# Patient Record
(demographics unavailable — no encounter records)

---

## 2024-10-17 NOTE — VITALS
[Maximal Pain Intensity: 4/10] : 4/10 [Least Pain Intensity: 0/10] : 0/10 [80: Normal activity with effort; some signs or symptoms of disease.] : 80: Normal activity with effort; some signs or symptoms of disease.  [ECOG Performance Status: 2 - Ambulatory and capable of all self care but unable to carry out any work activities] : Performance Status: 2 - Ambulatory and capable of all self care but unable to carry out any work activities. Up and about more than 50% of waking hours [Date: ____________] : Patient's last distress assessment performed on [unfilled]. [0 - No Distress] : Distress Level: 0

## 2024-10-17 NOTE — REVIEW OF SYSTEMS
[Fatigue] : fatigue [Recent Change In Weight] : ~T recent weight change [Confused] : confusion [Negative] : Allergic/Immunologic [Blurred Vision: Grade 0] : Blurred Vision: Grade 0 [Mucositis Oral: Grade 0] : Mucositis Oral: Grade 0  [Xerostomia: Grade 0] : Xerostomia: Grade 0 [Oral Pain: Grade 0] : Oral Pain: Grade 0 [Concentration Impairment: Grade 0] : Concentration Impairment: Grade 0 [Dizziness: Grade 0] : Dizziness: Grade 0  [Headache: Grade 0] : Headache: Grade 0 [Lethargy: Grade 0] : Lethargy: Grade 0 [Fever] : no fever [Chills] : no chills [Night Sweats] : no night sweats [Dizziness] : no dizziness [Fainting] : no fainting [Difficulty Walking] : no difficulty walking [FreeTextEntry2] : 10 to 15 pounds weight loss [de-identified] : slight confusion or memory

## 2024-10-17 NOTE — HISTORY OF PRESENT ILLNESS
[FreeTextEntry1] : This is a 61 y/o M with a h/o HTN, DM2 who Presents for consultation to discuss the possible role of radiation therapy in his care.   The course of illness began when he presented to [facility] with altered mental status in September 2024. Reports he was having difficulty participating in work related activities thus he stopped going, also with decreased appetite and was staying in bed most days. Family also noted he was not his usual self.  During workup in the ER [facility] on 9/28/2024 appreciated an INTRA-AXIAL: 6.1 x 5.0 x 3.8 cm mostly hypoattenuating mass lesion in the left anterior frontal lobe crossing the anterior falx into the medial right temporal lobe with surrounding vasogenic edema. There is severe mass effect on the surrounding parenchyma and on the ventricular system with marked left-to-right midline shift of 1.6 cm. Metastatic workup was completed to include CT chest/abdomen on said day IMPRESSION: Large left lung mass concerning for primary lung neoplasm.1.3 cm gastrohepatic lymph node, and 1.4 cm lytic lesion in the left iliac bone concerning for distal metastasis.  MRI brain w w/o contrast 9/30/2024IMPRESSION:6.5 x 5.1 x 4.8 cm (maximal AP x TV x CC dimensions) peripherally enhancing, centrally necrotic mass within the left anterior frontal lobe demonstrating regions of restricted diffusion. Findings are suspicious for the presence of glioblastoma multiform. Differential diagnosis includes metastatic disease. Rightward midline shift of 1 cm. Rightward subfalcine herniation of 1.6 cm.  Patient was admitted for treatment and ultimately underwent a Left frontotemporal craniotomy and excision of tumor with MR stereotactic and ultrasonic guidance with Dr. Ty Reyes on 10/1/2024 PATH: Left frontal tumor on dura, biopsy Metastatic non-small cell carcinoma, compatible with adenocarcinoma of lung origin   MRI brain w w/o contrast 10/2/2024 IMPRESSION: Postsurgical changes in the left frontal region status post left frontal craniotomy and mass resection. Left frontal vasogenic edema with mass effect on the left lateral ventricle and approximately 1.0 cm of rightward midline shift similar to presurgical exam. Focal plaque like region of enhancement in the posterior aspect of the resection cavity possibly representing residual enhancing tumor versus postoperative changes. Recommend continued follow-up.  VISIT DATED: 10/17/2024 Patient presents for consult with Dr. Robb. Pt reports that he has lost 10 to 15 pounds since diagnosis. He does not sleep at night and doesn't have much pain. Spouse reports that he has unstable gait but the pt denies it. Pt used to be a smoker before the surgery smoking cigarettes for a couple years and switching over to cigars. Pt stiches are intact no redness or swelling surrounding the area. Pt wants to remain positive during this of treatment. Reports  Patient scheduled with Dr. Josh Roman (heme/onco) 10/18/2024

## 2024-10-17 NOTE — REVIEW OF SYSTEMS
[Fatigue] : fatigue [Recent Change In Weight] : ~T recent weight change [Confused] : confusion [Negative] : Allergic/Immunologic [Blurred Vision: Grade 0] : Blurred Vision: Grade 0 [Mucositis Oral: Grade 0] : Mucositis Oral: Grade 0  [Xerostomia: Grade 0] : Xerostomia: Grade 0 [Oral Pain: Grade 0] : Oral Pain: Grade 0 [Concentration Impairment: Grade 0] : Concentration Impairment: Grade 0 [Dizziness: Grade 0] : Dizziness: Grade 0  [Headache: Grade 0] : Headache: Grade 0 [Lethargy: Grade 0] : Lethargy: Grade 0 [Fever] : no fever [Chills] : no chills [Night Sweats] : no night sweats [Dizziness] : no dizziness [Fainting] : no fainting [Difficulty Walking] : no difficulty walking [FreeTextEntry2] : 10 to 15 pounds weight loss [de-identified] : slight confusion or memory

## 2024-10-17 NOTE — HISTORY OF PRESENT ILLNESS
[FreeTextEntry1] : This is a 61 y/o M with a h/o HTN, DM2 who Presents for consultation to discuss the possible role of radiation therapy in his care.   The course of illness began when he presented to [facility] with altered mental status in September 2024. Reports he was having difficulty participating in work related activities thus he stopped going, also with decreased appetite and was staying in bed most days. Family also noted he was not his usual self.  During workup in the ER CTH on 9/28/2024 appreciated an INTRA-AXIAL: 6.1 x 5.0 x 3.8 cm mostly hypoattenuating mass lesion in the left anterior frontal lobe crossing the anterior falx into the medial right temporal lobe with surrounding vasogenic edema. There is severe mass effect on the surrounding parenchyma and on the ventricular system with marked left-to-right midline shift of 1.6 cm. Metastatic workup was completed to include CT chest/abdomen on said day IMPRESSION: Large left lung mass concerning for primary lung neoplasm.1.3 cm gastrohepatic lymph node, and 1.4 cm lytic lesion in the left iliac bone concerning for distal metastasis.  MRI brain w w/o contrast 9/30/2024IMPRESSION:6.5 x 5.1 x 4.8 cm (maximal AP x TV x CC dimensions) peripherally enhancing, centrally necrotic mass within the left anterior frontal lobe demonstrating regions of restricted diffusion. Findings are suspicious for the presence of glioblastoma multiform. Differential diagnosis includes metastatic disease. Rightward midline shift of 1 cm. Rightward subfalcine herniation of 1.6 cm.  Patient was admitted for treatment and ultimately underwent a Left frontotemporal craniotomy and excision of tumor with MR stereotactic and ultrasonic guidance with Dr. Ty Reyes on 10/1/2024 PATH: Left frontal tumor on dura, biopsy Metastatic non-small cell carcinoma, compatible with adenocarcinoma of lung origin   MRI brain w w/o contrast 10/2/2024 IMPRESSION: Postsurgical changes in the left frontal region status post left frontal craniotomy and mass resection. Left frontal vasogenic edema with mass effect on the left lateral ventricle and approximately 1.0 cm of rightward midline shift similar to presurgical exam. Focal plaque like region of enhancement in the posterior aspect of the resection cavity possibly representing residual enhancing tumor versus postoperative changes. Recommend continued follow-up.  VISIT DATED: 10/17/2024 Patient presents for consult with Dr. Robb. Pt reports that he has lost 10 to 15 pounds since diagnosis. He does not sleep at night and doesn't have much pain. Spouse reports that he has unstable gait but the pt denies it. Pt used to be a smoker before the surgery smoking cigarettes for a couple years and switching over to cigars. Pt stiches are intact no redness or swelling surrounding the area. Pt wants to remain positive during this of treatment. Reports  Patient scheduled with Dr. Josh Roman (heme/onco) 10/18/2024

## 2024-10-18 NOTE — PHYSICAL EXAM
[Restricted in physically strenuous activity but ambulatory and able to carry out work of a light or sedentary nature] : Status 1- Restricted in physically strenuous activity but ambulatory and able to carry out work of a light or sedentary nature, e.g., light house work, office work [Normal] : affect appropriate [de-identified] : No icterus  [de-identified] : MMM O/P Clear [de-identified] : Supple No LAD; No thyromegaly  [de-identified] : Clear [de-identified] : S1 S2 [de-identified] : No edema  [de-identified] : Soft NT/ND No masses [de-identified] : No spine/CVA tenderness

## 2024-10-18 NOTE — HISTORY OF PRESENT ILLNESS
[Disease: _____________________] : Disease: [unfilled] [AJCC Stage: ____] : AJCC Stage: [unfilled] [de-identified] : Mr. Tomlin is a 62-year-old man, non-smoker, with PMH of HTN and DM2 who presented to [facility] 9/26/24 with decreased intake and altered mental status x 1 week. CT head followed by brain MRI showed a 6.5 x 5.1 x 4.8 cm peripherally enhancing, centrally necrotic mass in the left anterior frontal lobe, with rightward midline shift. 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 On 10/1/24 he underwent left craniotomy for tumor resection. Pathology of the excised left frontal brain tumor was interpreted as showing non-small cell carcinoma compatible with adenocarcinoma of lung origin. PDL1 TPS >95%.   Patient presents today with his wife for initial outpatient visit. He reports that his breathing is satisfactory.  Denies cough.  His wife reports that he wheezes at night.  He lost several pounds over the past few months. He completed the dexamethasone taper. He met with radiation oncology with plans for postop RT. [de-identified] : - Left frontal brain tumor excision: 10/1/2024: Metastatic non-small cell carcinoma compatible with adenocarcinoma of lung origin. PD-L1 high-positive > 95%.  Foundation pending

## 2024-10-18 NOTE — HISTORY OF PRESENT ILLNESS
[Disease: _____________________] : Disease: [unfilled] [AJCC Stage: ____] : AJCC Stage: [unfilled] [de-identified] : Mr. Tomlin is a 62-year-old man, non-smoker, with PMH of HTN and DM2 who presented to [facility] 9/26/24 with decreased intake and altered mental status x 1 week. CT head followed by brain MRI showed a 6.5 x 5.1 x 4.8 cm peripherally enhancing, centrally necrotic mass in the left anterior frontal lobe, with rightward midline shift. 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 On 10/1/24 he underwent left craniotomy for tumor resection. Pathology of the excised left frontal brain tumor was interpreted as showing non-small cell carcinoma compatible with adenocarcinoma of lung origin. PDL1 TPS >95%.   Patient presents today with his wife for initial outpatient visit. He reports that his breathing is satisfactory.  Denies cough.  His wife reports that he wheezes at night.  He lost several pounds over the past few months. He completed the dexamethasone taper. He met with radiation oncology with plans for postop RT. [de-identified] : - Left frontal brain tumor excision: 10/1/2024: Metastatic non-small cell carcinoma compatible with adenocarcinoma of lung origin. PD-L1 high-positive > 95%.  Foundation pending

## 2024-10-18 NOTE — RESULTS/DATA
[FreeTextEntry1] : - CT C/A/P with contrast 9/26/24: Large left lung mass concerning for primary lung neoplasm. 1.3 cm gastrohepatic lymph node, and 1.4cm lytic lesion in the left iliac bone concerning for distal metastasis.   - CT Head without contrast: 9/26/24: 6.1 x 5 x 3.8 cm mostly hypoattenuating mass lesion in the left anterior frontal lobe, crossing the anterior falx into the medial right temporal lobe with surrounding vasogenic edema. There is severe mass effect on the surrounding parenchyma and on the ventricular system with marked left-to-right midline shift of 1.6cm. Recommend contrast-enhanced MRI brain for further characterization.   - MRI brain 9/30/24: 6.5 x 5.1 x 4.8 cm peripherally enhancing, centrally necrotic mass within the left anterior frontal lobe demonstrating regions of restricted diffusion. Findings are suspicious for the presence of glioblastoma multiform. Differential diagnosis includes metastatic disease.  Right midline shift of 1cm. Rightward subfalcine herniation of 1.6cm. Basal cisterns are visualized. Mild asymmetric dilation of the right lateral ventricle.   - Surgical Pathology 10/1/24: 1. Left frontal tumor on dura, biopsy: Metastatic non-small cell carcinoma, compatible with adenocarcinoma of lung origin. 2. Left frontal tumor on dura, biopsy: Metastatic non-small cell carcinoma, compatible with adenocarcinoma of lung origin. 3. Left frontal brain tumor on dura, excision: Metastatic non-small cell carcinoma, compatible with adenocarcinoma of lung origin.  Note: Sections show a high-grade malignant neoplasm with predominantly solid growth and scattered foci of necrosis.  IHC: neoplastic cells are diffusely positive for high-molecular weight (HMW), cytokeratin, CK7, TTF-1, and p63, and are negative for CK20, GATA3, p40, NKX3.1, and PAAX8. GFAP highlights background brain parenchyma.  PDL1 TPS >95%.   - Cytology 10/1/24: Brain, left, frontal, fluid: Positive for malignant cells. Carcinoma.   - CT head without contrast 10/2/24: Postoperative changes compatible with left frontal craniotomy. Some low attenuation identified in the postop region with some associated air as well as subtle high attenuation. this area of high changes compatible areas of hemorrhage on postop material. Surrounding edema is again seen. Decreased mass effect on the anteior aspect of both lateral ventricle is seen. Residual right left shift is seen (1.1cm).   -MR Brain 10/2/24: Postsurgical changes in the left frontal region status post left frontal craniotomy and mass resection.  Left frontal vasogenic edema with mass effect on the left lateral ventricle and approximately 1cm of rightward midline shift similar to presurgical exam.  Focal plaque like region of enhancement in the posterior aspect of the resection cavity possibly representing residual enhancing tumor versus postoperative changes. Recommend continued follow-up.   - PET/CT 10/16/24:  1. Large peripherally FDG-avid, centrally photopenic multiloculated mass in the left perihilar/paramediastinal left upper lobe corresponds to known malignancy. Separate FDG-avid left upper lobe nodule is compatible with metastasis.  2. FDG-avid left perihilar, bilateral mediastinal, bilateral cervical, and distal right external iliac lymph nodes. The left perihilar and AP window lymph nodes are compatible with metastatic disease. Small FDG-avid bilateral mediastinal/cervical and right external iliac lymph nodes are indeterminate. Differential diagnosis includes inflammatory and/or neoplastic etiologies. Cervical/supraclavicular lymph nodes are accessible to an ultrasound-guided percutaneous needle biopsy.  3. Enlarged prostate gland with right greater than left hypermetabolism. Differential diagnosis includes infectious/inflammatory etiologies and prostate carcinoma. Urology consultation is recommended.  4. Nonspecific FDG-avid thickening of upper esophagus and nonspecific hypermetabolism of fundus and body of stomach, most intense along the lesser curvature, are indeterminate. Endoscopy is recommended to exclude neoplasm.  5. Mild, diffuse hypermetabolism in nonenlarged thyroid gland suggests thyroiditis. Please correlate clinically.  - Inpatient labs October 2024: WBC 20.8 hemoglobin 10.7 MCV 87 platelets 397K.  Serum creatinine 0.6-0.88

## 2024-10-18 NOTE — HISTORY OF PRESENT ILLNESS
[Disease: _____________________] : Disease: [unfilled] [AJCC Stage: ____] : AJCC Stage: [unfilled] [de-identified] : Mr. Tomlin is a 62-year-old man, non-smoker, with PMH of HTN and DM2 who presented to [facility] 9/26/24 with decreased intake and altered mental status x 1 week. CT head followed by brain MRI showed a 6.5 x 5.1 x 4.8 cm peripherally enhancing, centrally necrotic mass in the left anterior frontal lobe, with rightward midline shift. 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 On 10/1/24 he underwent left craniotomy for tumor resection. Pathology of the excised left frontal brain tumor was interpreted as showing non-small cell carcinoma compatible with adenocarcinoma of lung origin. PDL1 TPS >95%.   Patient presents today with his wife for initial outpatient visit. He reports that his breathing is satisfactory.  Denies cough.  His wife reports that he wheezes at night.  He lost several pounds over the past few months. He completed the dexamethasone taper. He met with radiation oncology with plans for postop RT. [de-identified] : - Left frontal brain tumor excision: 10/1/2024: Metastatic non-small cell carcinoma compatible with adenocarcinoma of lung origin. PD-L1 high-positive > 95%.  Foundation pending

## 2024-10-18 NOTE — ASSESSMENT
[Palliative] : Goals of care discussed with patient: Palliative [FreeTextEntry1] : NSCLC with adenocarcinoma histology with a large ABAD primary tumor with evidence of metastatic disease involving the intrathoracic lymph nodes, cervical lymph nodes and brain.  Discussed that his disease represents stage IV disease or metastatic disease that is not curable however systemic therapy can be employed with the goal of prolonging survival.  Patient is status post craniotomy resection of a large left frontal brain tumor metastasis October 2024.  Tumor tested PD-L1 high-positive and molecular testing is pending.  Discussed that the mainstay of treatment would be systemic therapy. Recommend: - To undergo postop GK-SRS status post craniotomy resection of his brain metastasis - Follow-up molecular testing on his brain metastasis which is currently pending.  Discussed that it is unlikely an actionable mutation is present that is appropriate for targeting in the first-line setting given his extensive smoking history - Discussed and recommended first-line systemic therapy assuming his molecular testing is negative for any actionable mutation appropriate for treatment in the first-line setting.  Given the PD-L1 high-positive status of his tumor, and given that the patient has been titrated off steroids, could pursue treatment with single agent pembrolizumab 200 mg IV every 3 weeks.  Also discussed potentially the addition of chemotherapy with carboplatin AUC 5 and pemetrexed 500 mg/m2 administered IV every 3 weeks. - Risks, benefits and side effects of systemic therapy were discussed with the patient who agreed to proceed and signed the consent form; drug info sheets provided. -Obtain blood work in the office today in preparation for systemic therapy - Evaluate PSA given the PET CT scan findings involving the prostate - Evaluate TFTs given the PET CT scan findings involving the thyroid gland - Obtain initial anemia evaluation with labs today - There was an apparent lytic lesion of the left iliac bone noted on inpatient CT scan, however, this was not noted to be hypermetabolic on his PET CT scan.  Would avoid the addition of a bone modifying agent at this juncture - PET CT scan noted non-FDG avid thickening of the upper esophagus and nonspecific avidity of the stomach which were felt to be indeterminant for which EGD could be considered.  Consider nonurgent GI evaluation in the future. - In summary: Pursue postop GK-SRS treatment for his brain with radiation oncology.  Follow-up molecular testing results.  If negative for an actionable mutation, would plan to proceed with single agent pembrolizumab given the PD-L1 high-positive status of the tumor.  Could consider the role of the addition of chemotherapy with carboplatin/pemetrexed depending upon his clinical course.  Would plan to obtain a restaging scan following 3 cycles to assess response.  Discussed that treatment will be ongoing, for as long as the benefit the patient, or less he developed an intolerable side effect.  Can arrange for the start of systemic therapy likely for late October or early November depending upon completion of his RT treatment. All questions answered to their apparent satisfaction

## 2024-10-18 NOTE — PHYSICAL EXAM
[Restricted in physically strenuous activity but ambulatory and able to carry out work of a light or sedentary nature] : Status 1- Restricted in physically strenuous activity but ambulatory and able to carry out work of a light or sedentary nature, e.g., light house work, office work [Normal] : affect appropriate [de-identified] : No icterus  [de-identified] : MMM O/P Clear [de-identified] : Supple No LAD; No thyromegaly  [de-identified] : Clear [de-identified] : S1 S2 [de-identified] : No edema  [de-identified] : Soft NT/ND No masses [de-identified] : No spine/CVA tenderness

## 2024-10-18 NOTE — PHYSICAL EXAM
[Restricted in physically strenuous activity but ambulatory and able to carry out work of a light or sedentary nature] : Status 1- Restricted in physically strenuous activity but ambulatory and able to carry out work of a light or sedentary nature, e.g., light house work, office work [Normal] : affect appropriate [de-identified] : No icterus  [de-identified] : MMM O/P Clear [de-identified] : Supple No LAD; No thyromegaly  [de-identified] : Clear [de-identified] : S1 S2 [de-identified] : No edema  [de-identified] : Soft NT/ND No masses [de-identified] : No spine/CVA tenderness

## 2024-10-23 NOTE — REASON FOR VISIT
[Follow-Up: _____] : a [unfilled] follow-up visit [FreeTextEntry1] : Hospital Course:  Discharge Date 08-Oct-2024    Admission Date 26-Sep-2024 21:37  Reason for Admission 10/1 s/p left craniotomy for tumor resection    Hospital Course   62M w/ PMH of HTN, DM2 pw AMS. Reports of not going to work for the past week,  staying in bed w/ decreased PO intake. Family states he has also "not been  himself" In the ED VSS w/ labs notable for mild leukocytosis and imaging c/f  left lung mass and frontotemporal mass w/ midline shift and vasogenic edema.  Clinical diagnosis of lung cancer with a left lung mass and likely left frontal  brain metastasis. Path pending. Patient underwent on 10/1 Left crani for tumor  resection. Oncology following on 10/8 immunostains support non-small cell  carcinoma of lung origin (positive for CK7 and TTF-1). Given that this is not  small cell carcinoma, he does not need inpatient treatment. They will set up  outpatient follow up for him with a thoracic oncologist (per wife patient was  given an appointment with Dr. Rogers on 10/18 at 9AM. Pulmonology following  for CT chest with 7.4 x 4.8 cm L lung mass, lung, mediastinal adenopathy. L  lung mass noted to encompasses many of the ABAD pulmonary arterial branches, per  Pulmonology unlikely to require lung biopsy at this point. Hospital course  noted for moderate leukocytosis, currently on dexamethasone taper over 14 days.  ID following patient saw due to Enterococcus growing in broth only, the rest of  culture has been negative, patient non-toxic appearing and leukocytosis likely  to do steroids, monitor off abx. Radiation Oncology consulted and patient to  follow up with Dr. Robb outpatient. Physical therapy and occupational therapy  recommended outpatient PT/OT. On the day of discharge he is medically and  neurologically stable for discharge to home.    Med Reconciliation:  Override IMPROVE-DD recommendations due to: This is a surgical and/or  non-medical patient.  Recommended Post-Discharge VTE Prophylaxis This is a surgical and/or  non-medical patient.  Medication Reconciliation Status Admission Reconciliation is Completed  Discharge Reconciliation is Completed    Discharge Medications acetaminophen 325 mg oral tablet: 2 tab(s) orally every 6  hours As needed Mild Pain (1 - 3)  bisacodyl 5 mg oral delayed release tablet: 1 tab(s) orally once a day As  needed Constipation  dexAMETHasone 1 mg oral tablet: 3 tab(s) orally every 8 hours 3 tabs every 8  hours x 1 day, 2 tabs every 8 hours x 2 days, 2 tabs every 12 hours x 2 days, 2  tabs daily x 2 days, then 1 tab daily for 2 days then stop  levETIRAcetam 500 mg oral tablet: 1 tab(s) orally 2 times a day  pantoprazole 40 mg oral delayed release tablet: 1 tab(s) orally once a day  (before a meal)  polyethylene glycol 3350 oral powder for reconstitution: 17 gram(s) orally once  a day  senna leaf extract oral tablet: 2 tab(s) orally once a day (at bedtime)

## 2024-10-23 NOTE — ASSESSMENT
[FreeTextEntry1] : Pt is s/p resection of a large left frontal NSLC (pt has left lung mass) on Oct 1.  He had initial speech affectation and confusion.  Seen by Oncology who is starting Ketruda and possible chemo for NSLC.  Pt saw Dr. Robb last Thursday who will arrange SRS, hypo fractionated, for tumor region  Pt states he is not feeling too bad. Every once in a while he gets a twinge in the surgical area.  Pt speech is not to bad.  Pt feels a little slower than before.  No headaches per se except for the twinges.  Pt is off the steroid medication. Pt is still on Keppra 500 mg bid. Pt is going to get outpatient OT and PT. He is doing well right now with few symptoms.  Plan:  Pt for SRS with Dr. Robb Continue Keppra for now. Return in 1 month for follow up. Send report to PCP.

## 2024-10-23 NOTE — RESULTS/DATA
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 Evaluate extent of disease.  TREATMENT STRATEGY EVALUATION: Initial AREA IMAGED: Skull base-to-thigh FASTING BLOOD SUGAR: 83 mg/dl RADIOPHARMACEUTICAL: 10.50 mCi F-18 FDG, I.V. I.V. SITE: antecubital right UPTAKE PERIOD: 65 min SCANNER: [employer] Gen2 ORAL CONTRAST: Omnipaque 300 PHARMACOLOGIC INTERVENTION: None.  TECHNIQUE: Following intravenous injection of above radiopharmaceutical and uptake period, PET/CT was performed. CT protocol was optimized for PET attenuation correction and anatomic localization and was not designed to produce and cannot replace state-of-the-art diagnostic CT images with specific imaging protocols for different body parts and indications. Images were reconstructed and reviewed in axial, coronal and sagittal views and three-dimensional MIP.  The standardized uptake values (SUV) are normalized to patient body weight and indicate the highest activity concentration (SUVmax) in a given site. All image numbers refer to axial image number.  COMPARISON: None.  OTHER STUDIES USED FOR CORRELATION: None  FINDINGS:  HEAD/NECK: Incompletely imaged postoperative changes in left frontal calvarium and subadjacent frontal lobe, are better evaluated on recent head CT. Photopenic hypodensity in left frontal lobe incompletely imaged photopenic hypodense area in left frontal lobe, compatible with postsurgical changes.  Several small FDG-avid bilateral level 1, 2, and 4 cervical lymph nodes. For reference, a left level 1B node measures 0.9 x 0.6 cm, SUV 6.7 (image 48) a difficult to delineate right level IIA node measures 0.8 x 0.6 cm, SUV 9.4 (image 54).  Mild, diffuse FDG activity in nonenlarged thyroid gland suggests thyroiditis (SUV 3.5; image 80).  THORAX: Few small FDG-avid lymph nodes in bilateral supraclavicular, left paratracheal, AP window, right upper paraesophageal, and left perihilar region, similar in appearance as compared to recent CT. For reference, a subcentimeter, difficult to delineate right supraclavicular lymph node demonstrates SUV 7.3 (image 77). A left upper paratracheal node measures 0.8 x 0.7 cm, SUV 5.9 (image 94), AP window lymphadenopathy measures 1.7 x 1.5 cm, SUV 7.1 (image 107). Sub-5 mm right upper paraesophageal lymph nodes demonstrate SUV 5.4 (image 85). Difficult to delineate left hilar lymphadenopathy demonstrates SUV 17.3 (image 122).  LUNGS: Large peripherally FDG-avid, centrally photopenic multiloculated mass involving the left hilar, left upper lobe paramediastinal lung and left apex is similar in appearance as compared to CT dated 9/26/2024, measuring 6.5 x 3.6 cm, SUV 13.6 in left paramediastinal region (image 108). Separate FDG-avid left upper lobe pulmonary nodule, also unchanged on CT, measures 1.3 x 1.1 cm, SUV 7.2 (image 88). 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  PLEURA/PERICARDIUM: No abnormal FDG activity. No effusion.  HEPATOBILIARY/PANCREAS: Physiologic FDG activity. For reference, normal liver demonstrates SUV mean 2.0.  SPLEEN: Physiologic FDG activity. Normal in size.  ADRENAL GLANDS: No abnormal FDG activity. No nodule.  KIDNEYS/URINARY BLADDER: Physiologic excreted FDG activity.  REPRODUCTIVE ORGANS: Prostate gland is enlarged, measuring 5.8 cm in maximum transverse diameter. There is increased radiotracer activity in the bilateral prostate gland, more extensive on the right (SUV 14.2; image 283).  ABDOMINOPELVIC LYMPH NODES/RETROPERITONEUM: FDG-avid 5 mm right external iliac lymph node (SUV 5.8; image 260).  ESOPHAGUS/STOMACH/BOWEL/PERITONEUM/MESENTERY: Nonspecific FDG-avid thickening of upper esophagus (SUV 6.0; image 89)., Diffuse hypermetabolism in fundus and body of stomach, most intense along the lesser curvature (SUV 10.7; image 172).  VESSELS: Unremarkable.  BONES/SOFT TISSUES: No abnormal FDG activity.  IMPRESSION: Abnormal skull-to-thigh FDG-PET/CT scan.  1. Large peripherally FDG-avid, centrally photopenic multiloculated mass in the left perihilar/paramediastinal left upper lobe corresponds to known malignancy. Separate FDG-avid left upper lobe nodule is compatible with metastasis.  2. FDG-avid left perihilar, bilateral mediastinal, bilateral cervical, and distal right external iliac lymph nodes. The left perihilar and AP window lymph nodes are compatible with metastatic disease. Small FDG-avid bilateral mediastinal/cervical and right external iliac lymph nodes are indeterminate. Differential diagnosis includes inflammatory and/or neoplastic etiologies. Cervical/supraclavicular lymph nodes are accessible to an ultrasound-guided percutaneous needle biopsy.  3. Enlarged prostate gland with right greater than left hypermetabolism. Differential diagnosis includes infectious/inflammatory etiologies and prostate carcinoma. Urology consultation is recommended.  4. Nonspecific FDG-avid thickening of upper esophagus and nonspecific hypermetabolism of fundus and body of stomach, most intense along the lesser curvature, are indeterminate. Endoscopy is recommended to exclude neoplasm.  5. Mild, diffuse hypermetabolism in nonenlarged thyroid gland suggests thyroiditis. Please correlate clinically.  --- End of Report ---

## 2024-10-23 NOTE — PHYSICAL EXAM
[General Appearance - Alert] : alert [General Appearance - In No Acute Distress] : in no acute distress [Clean] : clean [Dry] : dry [Healing Well] : healing well [Intact] : intact [Sutures Intact] : closed with intact sutures [Oriented To Time, Place, And Person] : oriented to person, place, and time [Impaired Insight] : insight and judgment were intact [Motor Tone] : muscle tone was normal in all four extremities [FreeTextEntry1] : left frontotemporal incision with soft subcutaneous dependent temporal collections.  Monocryls intact [FreeTextEntry6] : MUNGUIA, no drift, good . [FreeTextEntry8] : gait is slow, but alternating and symmetrical.  Very mild imbalance at times.

## 2024-10-23 NOTE — RESULTS/DATA
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 Evaluate extent of disease.  TREATMENT STRATEGY EVALUATION: Initial AREA IMAGED: Skull base-to-thigh FASTING BLOOD SUGAR: 83 mg/dl RADIOPHARMACEUTICAL: 10.50 mCi F-18 FDG, I.V. I.V. SITE: antecubital right UPTAKE PERIOD: 65 min SCANNER: [employer] Gen2 ORAL CONTRAST: Omnipaque 300 PHARMACOLOGIC INTERVENTION: None.  TECHNIQUE: Following intravenous injection of above radiopharmaceutical and uptake period, PET/CT was performed. CT protocol was optimized for PET attenuation correction and anatomic localization and was not designed to produce and cannot replace state-of-the-art diagnostic CT images with specific imaging protocols for different body parts and indications. Images were reconstructed and reviewed in axial, coronal and sagittal views and three-dimensional MIP.  The standardized uptake values (SUV) are normalized to patient body weight and indicate the highest activity concentration (SUVmax) in a given site. All image numbers refer to axial image number.  COMPARISON: None.  OTHER STUDIES USED FOR CORRELATION: None  FINDINGS:  HEAD/NECK: Incompletely imaged postoperative changes in left frontal calvarium and subadjacent frontal lobe, are better evaluated on recent head CT. Photopenic hypodensity in left frontal lobe incompletely imaged photopenic hypodense area in left frontal lobe, compatible with postsurgical changes.  Several small FDG-avid bilateral level 1, 2, and 4 cervical lymph nodes. For reference, a left level 1B node measures 0.9 x 0.6 cm, SUV 6.7 (image 48) a difficult to delineate right level IIA node measures 0.8 x 0.6 cm, SUV 9.4 (image 54).  Mild, diffuse FDG activity in nonenlarged thyroid gland suggests thyroiditis (SUV 3.5; image 80).  THORAX: Few small FDG-avid lymph nodes in bilateral supraclavicular, left paratracheal, AP window, right upper paraesophageal, and left perihilar region, similar in appearance as compared to recent CT. For reference, a subcentimeter, difficult to delineate right supraclavicular lymph node demonstrates SUV 7.3 (image 77). A left upper paratracheal node measures 0.8 x 0.7 cm, SUV 5.9 (image 94), AP window lymphadenopathy measures 1.7 x 1.5 cm, SUV 7.1 (image 107). Sub-5 mm right upper paraesophageal lymph nodes demonstrate SUV 5.4 (image 85). Difficult to delineate left hilar lymphadenopathy demonstrates SUV 17.3 (image 122).  LUNGS: Large peripherally FDG-avid, centrally photopenic multiloculated mass involving the left hilar, left upper lobe paramediastinal lung and left apex is similar in appearance as compared to CT dated 9/26/2024, measuring 6.5 x 3.6 cm, SUV 13.6 in left paramediastinal region (image 108). Separate FDG-avid left upper lobe pulmonary nodule, also unchanged on CT, measures 1.3 x 1.1 cm, SUV 7.2 (image 88). 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  PLEURA/PERICARDIUM: No abnormal FDG activity. No effusion.  HEPATOBILIARY/PANCREAS: Physiologic FDG activity. For reference, normal liver demonstrates SUV mean 2.0.  SPLEEN: Physiologic FDG activity. Normal in size.  ADRENAL GLANDS: No abnormal FDG activity. No nodule.  KIDNEYS/URINARY BLADDER: Physiologic excreted FDG activity.  REPRODUCTIVE ORGANS: Prostate gland is enlarged, measuring 5.8 cm in maximum transverse diameter. There is increased radiotracer activity in the bilateral prostate gland, more extensive on the right (SUV 14.2; image 283).  ABDOMINOPELVIC LYMPH NODES/RETROPERITONEUM: FDG-avid 5 mm right external iliac lymph node (SUV 5.8; image 260).  ESOPHAGUS/STOMACH/BOWEL/PERITONEUM/MESENTERY: Nonspecific FDG-avid thickening of upper esophagus (SUV 6.0; image 89)., Diffuse hypermetabolism in fundus and body of stomach, most intense along the lesser curvature (SUV 10.7; image 172).  VESSELS: Unremarkable.  BONES/SOFT TISSUES: No abnormal FDG activity.  IMPRESSION: Abnormal skull-to-thigh FDG-PET/CT scan.  1. Large peripherally FDG-avid, centrally photopenic multiloculated mass in the left perihilar/paramediastinal left upper lobe corresponds to known malignancy. Separate FDG-avid left upper lobe nodule is compatible with metastasis.  2. FDG-avid left perihilar, bilateral mediastinal, bilateral cervical, and distal right external iliac lymph nodes. The left perihilar and AP window lymph nodes are compatible with metastatic disease. Small FDG-avid bilateral mediastinal/cervical and right external iliac lymph nodes are indeterminate. Differential diagnosis includes inflammatory and/or neoplastic etiologies. Cervical/supraclavicular lymph nodes are accessible to an ultrasound-guided percutaneous needle biopsy.  3. Enlarged prostate gland with right greater than left hypermetabolism. Differential diagnosis includes infectious/inflammatory etiologies and prostate carcinoma. Urology consultation is recommended.  4. Nonspecific FDG-avid thickening of upper esophagus and nonspecific hypermetabolism of fundus and body of stomach, most intense along the lesser curvature, are indeterminate. Endoscopy is recommended to exclude neoplasm.  5. Mild, diffuse hypermetabolism in nonenlarged thyroid gland suggests thyroiditis. Please correlate clinically.  --- End of Report ---

## 2024-10-23 NOTE — HISTORY OF PRESENT ILLNESS
[FreeTextEntry1] : Pt is s/p resection of a large left frontal NSLC (pt has left lung mass) on Oct 1.  He had initial speech affectation and confusion.  Seen by Oncology who is starting Ketruda and possible chemo for NSLC.  Pt saw Dr. Robb last Thursday who will arrange SRS, hypo fractionated, for tumor region  Pt states he is not feeling too bad. Every once in a while he gets a twinge in the surgical area.  Pt speech is not to bad.  Pt feels a little slower than before.  No headaches per se except for the twinges.  Pt is off the steroid medication. Pt is still on Keppra 500 mg bid. Pt is going to get outpatient OT and PT.  Sen information to PCP:  Wu Noble MD [address] (Corporate security) [phone] (Fax)

## 2024-10-23 NOTE — HISTORY OF PRESENT ILLNESS
[FreeTextEntry1] : Pt is s/p resection of a large left frontal NSLC (pt has left lung mass) on Oct 1.  He had initial speech affectation and confusion.  Seen by Oncology who is starting Ketruda and possible chemo for NSLC.  Pt saw Dr. Robb last Thursday who will arrange SRS, hypo fractionated, for tumor region  Pt states he is not feeling too bad. Every once in a while he gets a twinge in the surgical area.  Pt speech is not to bad.  Pt feels a little slower than before.  No headaches per se except for the twinges.  Pt is off the steroid medication. Pt is still on Keppra 500 mg bid. Pt is going to get outpatient OT and PT.  Sen information to PCP:  Wu Noble MD [address] (Corporate security) [phone] (Fax)

## 2024-11-20 NOTE — ASSESSMENT
[FreeTextEntry1] : Pt is s/p resection of a large left frontal NSLC (pt has left lung mass) on Oct 1. He had initial speech affectation and confusion. Seen by Oncology who is starting Ketruda and possible chemo for NSLC. Pt saw Dr. Robb last Thursday who will arrange SRS, hypo fractionated, for tumor region Pt states he is not feeling too bad. Every once in a while he gets a twinge in the surgical area. Pt speech is not to bad. Pt feels a little slower than before. No headaches per se except for the twinges. Pt is on Dex taper after SRS 2 weeks ago.  He is coming along, appears well.  Is thinking about long-term disability.  Has MRI on Jan21.  Plan:  Return in approx 2 months (Jan 21+) to review his MRI and assess. Send report to PCP.       CAD

## 2024-11-20 NOTE — BEGINNING OF VISIT
[0] : 2) Feeling down, depressed, or hopeless: Not at all (0) [PHQ-9 Deferred] : PHQ-9 Deferred [[id number]] : 0 [Pain Scale: ___] : On a scale of 1-10, today the patient's pain is a(n) [unfilled]. [Former] : Former [20 or more] : 20 or more [< 15 Years] : < 15 Years [Date Discussed (MM/DD/YY): ___] : Discussed: [unfilled] [With Patient/Caregiver] : with Patient/Caregiver

## 2024-11-20 NOTE — ASSESSMENT
[FreeTextEntry1] : Pt is s/p resection of a large left frontal NSLC (pt has left lung mass) on Oct 1. He had initial speech affectation and confusion. Seen by Oncology who is starting Ketruda and possible chemo for NSLC. Pt saw Dr. Robb last Thursday who will arrange SRS, hypo fractionated, for tumor region Pt states he is not feeling too bad. Every once in a while he gets a twinge in the surgical area. Pt speech is not to bad. Pt feels a little slower than before. No headaches per se except for the twinges. Pt is on Dex taper after SRS 2 weeks ago.  He is coming along, appears well.  Is thinking about long-term disability.  Has MRI on Jan21.  Plan:  Return in approx 2 months (Jan 21+) to review his MRI and assess. Send report to PCP.

## 2024-11-20 NOTE — PHYSICAL EXAM
[General Appearance - Alert] : alert [General Appearance - In No Acute Distress] : in no acute distress [Oriented To Time, Place, And Person] : oriented to person, place, and time [Impaired Insight] : insight and judgment were intact [Motor Tone] : muscle tone was normal in all four extremities [Motor Strength] : muscle strength was normal in all four extremities [FreeTextEntry1] : left frontotermporal incision healing well. [FreeTextEntry6] : MUNGUIA with no drift, good . [FreeTextEntry8] : walks with some antalgic properties at first, then able to walk with alternating steps, mosly symmetrical, but slowish.

## 2024-11-20 NOTE — HISTORY OF PRESENT ILLNESS
[FreeTextEntry1] : Pt is s/p resection of a large left frontal NSLC (pt has left lung mass) on Oct 1. He had initial speech affectation and confusion. Seen by Oncology who is starting Ketruda and possible chemo for NSLC. Pt saw Dr. Robb last Thursday who will arrange SRS, hypo fractionated, for tumor region Pt states he is not feeling too bad. Every once in a while he gets a twinge in the surgical area. Pt speech is not to bad. Pt feels a little slower than before. No headaches per se except for the twinges. Pt is off the steroid medication. Pt is still on Keppra 500 mg bid. Pt is going to get outpatient OT and PT. He is doing well right now with few symptoms.  Today, pt is feeling so-so.  Had SRS about 2 weeks ago x 3 fractions 10/29,30,31/2024.  Feels like he is not as agile as he was before. Sometimes he feels unbalanced while walking.  Walks a little slower now.  PT started one round of chemo on 11/8.  No headaches or dizziness. No N/V. Pt has put on some weight and has a good appetite.  Saw OT on Thursday last week  Dexamethasone taper, folic acid. metoclopramide prn nausea.  PT not on Keppra anymore.

## 2024-11-26 NOTE — HISTORY OF PRESENT ILLNESS
[Disease: _____________________] : Disease: [unfilled] [AJCC Stage: ____] : AJCC Stage: [unfilled] [de-identified] : Mr. Tomlin is a 62-year-old man, non-smoker, with PMH of HTN and DM2 who presented to [facility] 9/26/24 with decreased intake and altered mental status x 1 week. CT head followed by brain MRI showed a 6.5 x 5.1 x 4.8 cm peripherally enhancing, centrally necrotic mass in the left anterior frontal lobe, with rightward midline shift. 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 On 10/1/24 he underwent left craniotomy for tumor resection. Pathology of the excised left frontal brain tumor was interpreted as showing non-small cell carcinoma compatible with adenocarcinoma of lung origin. PDL1 TPS >95%.  Foundation negative for actionable mutations (+ BRAF G466A, NF1, TP53, among others) and TMB-High. Treated with GK-SRS to brain in late October 2024.  Required ongoing steroids.  Started first line combination chemotherapy and immunotherapy with Carboplatin/Pemetrexed and Pembrolizumab 11/8/24.    [de-identified] : - Left frontal brain tumor excision: 10/1/2024: Metastatic non-small cell carcinoma compatible with adenocarcinoma of lung origin. PD-L1 high-positive > 95%.  Foundation:  + BRAF G466A, NF1, TP53, among others.  TMB-High.  [de-identified] : Since the initial visit, Mr. Tomlin received GK-SRS from 10/29 - 10/31/24. He then started first line systemic therapy with combination chemotherapy and immunotherapy, with carboplatin, pemetrexed, and pembrolizumab, received first cycle on 11/8/24.  Foundation negative for actionable mutations (+ BRAF G466A, NF1, TP53, among others) and TMB-High.  He is seen now for mid-cycle evaluation.  Mr. Tomlin reports tolerating first cycle of chemoimmunotherapy well with mild fatigue He reports appetite and physical activity tolerance are improved. Weight is increased.  Reports breathing well.  He is completing steroid taper prescribed by Dr. Robb, last dose taken today.

## 2024-11-26 NOTE — PHYSICAL EXAM
[Restricted in physically strenuous activity but ambulatory and able to carry out work of a light or sedentary nature] : Status 1- Restricted in physically strenuous activity but ambulatory and able to carry out work of a light or sedentary nature, e.g., light house work, office work [Normal] : affect appropriate [de-identified] : No icterus  [de-identified] : MMM O/P Clear [de-identified] : Supple No LAD; No thyromegaly  [de-identified] : Clear [de-identified] : S1 S2 [de-identified] : No edema  [de-identified] : Soft NT/ND No masses [de-identified] : No spine/CVA tenderness

## 2024-11-26 NOTE — PHYSICAL EXAM
[Restricted in physically strenuous activity but ambulatory and able to carry out work of a light or sedentary nature] : Status 1- Restricted in physically strenuous activity but ambulatory and able to carry out work of a light or sedentary nature, e.g., light house work, office work [Normal] : affect appropriate [de-identified] : No icterus  [de-identified] : MMM O/P Clear [de-identified] : Supple No LAD; No thyromegaly  [de-identified] : Clear [de-identified] : S1 S2 [de-identified] : No edema  [de-identified] : Soft NT/ND No masses [de-identified] : No spine/CVA tenderness

## 2024-11-26 NOTE — PHYSICAL EXAM
[Restricted in physically strenuous activity but ambulatory and able to carry out work of a light or sedentary nature] : Status 1- Restricted in physically strenuous activity but ambulatory and able to carry out work of a light or sedentary nature, e.g., light house work, office work [Normal] : affect appropriate [de-identified] : No icterus  [de-identified] : MMM O/P Clear [de-identified] : Supple No LAD; No thyromegaly  [de-identified] : Clear [de-identified] : S1 S2 [de-identified] : No edema  [de-identified] : Soft NT/ND No masses [de-identified] : No spine/CVA tenderness

## 2024-11-26 NOTE — HISTORY OF PRESENT ILLNESS
[Disease: _____________________] : Disease: [unfilled] [AJCC Stage: ____] : AJCC Stage: [unfilled] [de-identified] : Mr. Tomlin is a 62-year-old man, non-smoker, with PMH of HTN and DM2 who presented to [facility] 9/26/24 with decreased intake and altered mental status x 1 week. CT head followed by brain MRI showed a 6.5 x 5.1 x 4.8 cm peripherally enhancing, centrally necrotic mass in the left anterior frontal lobe, with rightward midline shift. 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 On 10/1/24 he underwent left craniotomy for tumor resection. Pathology of the excised left frontal brain tumor was interpreted as showing non-small cell carcinoma compatible with adenocarcinoma of lung origin. PDL1 TPS >95%.  Foundation negative for actionable mutations (+ BRAF G466A, NF1, TP53, among others) and TMB-High. Treated with GK-SRS to brain in late October 2024.  Required ongoing steroids.  Started first line combination chemotherapy and immunotherapy with Carboplatin/Pemetrexed and Pembrolizumab 11/8/24.    [de-identified] : - Left frontal brain tumor excision: 10/1/2024: Metastatic non-small cell carcinoma compatible with adenocarcinoma of lung origin. PD-L1 high-positive > 95%.  Foundation:  + BRAF G466A, NF1, TP53, among others.  TMB-High.  [de-identified] : Since the initial visit, Mr. Tomlin received GK-SRS from 10/29 - 10/31/24. He then started first line systemic therapy with combination chemotherapy and immunotherapy, with carboplatin, pemetrexed, and pembrolizumab, received first cycle on 11/8/24.  Foundation negative for actionable mutations (+ BRAF G466A, NF1, TP53, among others) and TMB-High.  He is seen now for mid-cycle evaluation.  Mr. Tomlin reports tolerating first cycle of chemoimmunotherapy well with mild fatigue He reports appetite and physical activity tolerance are improved. Weight is increased.  Reports breathing well.  He is completing steroid taper prescribed by Dr. Robb, last dose taken today.

## 2024-11-26 NOTE — RESULTS/DATA
[FreeTextEntry1] : - CT C/A/P with contrast 9/26/24: Large left lung mass concerning for primary lung neoplasm. 1.3 cm gastrohepatic lymph node, and 1.4cm lytic lesion in the left iliac bone concerning for distal metastasis.   - CT Head without contrast: 9/26/24: 6.1 x 5 x 3.8 cm mostly hypoattenuating mass lesion in the left anterior frontal lobe, crossing the anterior falx into the medial right temporal lobe with surrounding vasogenic edema. There is severe mass effect on the surrounding parenchyma and on the ventricular system with marked left-to-right midline shift of 1.6cm. Recommend contrast-enhanced MRI brain for further characterization.   - MRI brain 9/30/24: 6.5 x 5.1 x 4.8 cm peripherally enhancing, centrally necrotic mass within the left anterior frontal lobe demonstrating regions of restricted diffusion. Findings are suspicious for the presence of glioblastoma multiform. Differential diagnosis includes metastatic disease.  Right midline shift of 1cm. Rightward subfalcine herniation of 1.6cm. Basal cisterns are visualized. Mild asymmetric dilation of the right lateral ventricle.   - Surgical Pathology 10/1/24: 1. Left frontal tumor on dura, biopsy: Metastatic non-small cell carcinoma, compatible with adenocarcinoma of lung origin. 2. Left frontal tumor on dura, biopsy: Metastatic non-small cell carcinoma, compatible with adenocarcinoma of lung origin. 3. Left frontal brain tumor on dura, excision: Metastatic non-small cell carcinoma, compatible with adenocarcinoma of lung origin.  Note: Sections show a high-grade malignant neoplasm with predominantly solid growth and scattered foci of necrosis.  IHC: neoplastic cells are diffusely positive for high-molecular weight (HMW), cytokeratin, CK7, TTF-1, and p63, and are negative for CK20, GATA3, p40, NKX3.1, and PAAX8. GFAP highlights background brain parenchyma.  PDL1 TPS >95%.   - Cytology 10/1/24: Brain, left, frontal, fluid: Positive for malignant cells. Carcinoma.   - CT head without contrast 10/2/24: Postoperative changes compatible with left frontal craniotomy. Some low attenuation identified in the postop region with some associated air as well as subtle high attenuation. this area of high changes compatible areas of hemorrhage on postop material. Surrounding edema is again seen. Decreased mass effect on the anteior aspect of both lateral ventricle is seen. Residual right left shift is seen (1.1cm).   -MR Brain 10/2/24: Postsurgical changes in the left frontal region status post left frontal craniotomy and mass resection.  Left frontal vasogenic edema with mass effect on the left lateral ventricle and approximately 1cm of rightward midline shift similar to presurgical exam.  Focal plaque like region of enhancement in the posterior aspect of the resection cavity possibly representing residual enhancing tumor versus postoperative changes. Recommend continued follow-up.   - PET/CT 10/16/24:  1. Large peripherally FDG-avid, centrally photopenic multiloculated mass in the left perihilar/paramediastinal left upper lobe corresponds to known malignancy. Separate FDG-avid left upper lobe nodule is compatible with metastasis.  2. FDG-avid left perihilar, bilateral mediastinal, bilateral cervical, and distal right external iliac lymph nodes. The left perihilar and AP window lymph nodes are compatible with metastatic disease. Small FDG-avid bilateral mediastinal/cervical and right external iliac lymph nodes are indeterminate. Differential diagnosis includes inflammatory and/or neoplastic etiologies. Cervical/supraclavicular lymph nodes are accessible to an ultrasound-guided percutaneous needle biopsy.  3. Enlarged prostate gland with right greater than left hypermetabolism. Differential diagnosis includes infectious/inflammatory etiologies and prostate carcinoma. Urology consultation is recommended.  4. Nonspecific FDG-avid thickening of upper esophagus and nonspecific hypermetabolism of fundus and body of stomach, most intense along the lesser curvature, are indeterminate. Endoscopy is recommended to exclude neoplasm.  5. Mild, diffuse hypermetabolism in nonenlarged thyroid gland suggests thyroiditis. Please correlate clinically.

## 2024-11-26 NOTE — HISTORY OF PRESENT ILLNESS
[Disease: _____________________] : Disease: [unfilled] [AJCC Stage: ____] : AJCC Stage: [unfilled] [de-identified] : Mr. Tomlin is a 62-year-old man, non-smoker, with PMH of HTN and DM2 who presented to [facility] 9/26/24 with decreased intake and altered mental status x 1 week. CT head followed by brain MRI showed a 6.5 x 5.1 x 4.8 cm peripherally enhancing, centrally necrotic mass in the left anterior frontal lobe, with rightward midline shift. 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 On 10/1/24 he underwent left craniotomy for tumor resection. Pathology of the excised left frontal brain tumor was interpreted as showing non-small cell carcinoma compatible with adenocarcinoma of lung origin. PDL1 TPS >95%.  Foundation negative for actionable mutations (+ BRAF G466A, NF1, TP53, among others) and TMB-High. Treated with GK-SRS to brain in late October 2024.  Required ongoing steroids.  Started first line combination chemotherapy and immunotherapy with Carboplatin/Pemetrexed and Pembrolizumab 11/8/24.    [de-identified] : - Left frontal brain tumor excision: 10/1/2024: Metastatic non-small cell carcinoma compatible with adenocarcinoma of lung origin. PD-L1 high-positive > 95%.  Foundation:  + BRAF G466A, NF1, TP53, among others.  TMB-High.  [de-identified] : Since the initial visit, Mr. Tomlin received GK-SRS from 10/29 - 10/31/24. He then started first line systemic therapy with combination chemotherapy and immunotherapy, with carboplatin, pemetrexed, and pembrolizumab, received first cycle on 11/8/24.  Foundation negative for actionable mutations (+ BRAF G466A, NF1, TP53, among others) and TMB-High.  He is seen now for mid-cycle evaluation.  Mr. Tomlin reports tolerating first cycle of chemoimmunotherapy well with mild fatigue He reports appetite and physical activity tolerance are improved. Weight is increased.  Reports breathing well.  He is completing steroid taper prescribed by Dr. Robb, last dose taken today.

## 2024-11-26 NOTE — REASON FOR VISIT
[Initial Consultation] : an initial consultation [Spouse] : spouse [Follow-Up Visit] : a follow-up [FreeTextEntry2] : Lung Cancer

## 2024-11-26 NOTE — ASSESSMENT
[Palliative] : Goals of care discussed with patient: Palliative [FreeTextEntry1] : 62M former smoker with Stage IV/metastatic NSCLC with adenocarcinoma histology with a large ABAD primary tumor with evidence of metastatic disease involving the intrathoracic lymph nodes, cervical lymph nodes and brain.    Patient is status post craniotomy resection of a large left frontal brain tumor metastasis October 2024.  Tumor tested PD-L1 high-positive and Foundation negative for actionable mutations (+ BRAF G466A, NF1, TP53, among others) and TMB-High. Received post-op GK-SRS in late-October 2024. He started first-line systemic therapy with combination of chemotherapy and immunotherapy with carboplatin, pemetrexed, and pembrolizumab on 11/8/24.  Recommend: - Continue first line systemic treatment with carboplatin AUC 5, pemetrexed 500mg/m2, and pembrolizumab 200mg IV every 3 weeks. Received C1 11/8. Due for cycle 2 on 11/29.  - Check mid-cycle bloodwork today.  - Plan for imaging after 3 cycles to assess response.  - Plan for 4 cycles total of triplet therapy, after which he will continue with maintenance pemetrexed and pembrolizumab every 3 weeks.  - Continue follow up with Dr. Reyes and Dr. Robb, s/p craniotomy resection of large frontal tumor 10/1/24 and s/p GK-SRS completed 10/31/24. Completed dexamethasone taper today and has next MRI brain Jan 21.  - Prescribe dexamethasone 4mg twice a day in the stacey-chemo setting, to take the day before and the day after chemo.  - Normal screening PSA which was checked given the PET CT scan findings involving the prostate. No immediate need for urology follow up.  - Normal TFTs checked given the PET CT scan findings involving the thyroid gland. Will check TFTs periodically while on pembrolizumab.  - Potentially start iron supplements given mild iron deficiency on previous bloodwork.  - There was an apparent lytic lesion of the left iliac bone noted on inpatient CT scan, however, this was not noted to be hypermetabolic on his PET CT scan.  Would avoid the addition of a bone modifying agent at this juncture - PET CT scan noted non-FDG avid thickening of the upper esophagus and nonspecific avidity of the stomach which were felt to be indeterminant for which EGD could be considered.  Consider nonurgent GI evaluation in the future. - Continue mid-cycle evaluation after each chemoimmunotherapy cycle.

## 2024-12-11 NOTE — ASSESSMENT
[FreeTextEntry1] : 62M former smoker with Stage IV/metastatic NSCLC with adenocarcinoma histology with a large ABAD primary tumor with evidence of metastatic disease involving the intrathoracic lymph nodes, cervical lymph nodes and brain.    Patient is status post craniotomy resection of a large left frontal brain tumor metastasis October 2024.  Tumor tested PD-L1 high-positive and Foundation negative for actionable mutations (+ BRAF G466A, NF1, TP53, among others) and TMB-High. Received post-op GK-SRS in late-October 2024. He started first-line systemic therapy with combination of chemotherapy and immunotherapy with carboplatin, pemetrexed, and pembrolizumab on 11/8/24.  Received cycle 2 on 11/29/24.  Recommend: - Continue first line systemic treatment with carboplatin AUC 5, pemetrexed 500mg/m2, and pembrolizumab 200mg IV every 3 weeks. Due for cycle 3 on 12/20.  - Continue dexamethasone in the stacey-chemo setting the day before and the day after chemo. Continue folic acid 1mg by mouth daily and vitamin B12 injection every 9 weeks.  - Mid-cycle bloodwork today: CBC, CMP, CEA.  - Plan for imaging after 3 cycles to assess response.  - Plan for 4 cycles total of triplet therapy, after which he will continue with maintenance pemetrexed and pembrolizumab every 3 weeks.  - Continue follow up with Dr. Reyes and Dr. Robb, s/p craniotomy resection of large frontal tumor 10/1/24 and s/p GK-SRS completed 10/31/24. Completed dexamethasone taper. Next MRI brain Jan 21 with Dr. Robb follow up on 1/23.  - Reached out to Dr. Reyes and Dr. Robb regarding whether patient should be continuing Keppra; he reports running out 2 weeks ago. Awaiting response.  - Normal screening PSA which was checked given the PET CT scan findings involving the prostate. No immediate need for urology follow up.  - Normal TFTs checked given the PET CT scan findings involving the thyroid gland. Will check TFTs periodically while on pembrolizumab. TSH 1.18 on 11/21.  - Potentially start iron supplements given mild iron deficiency on previous bloodwork.  - There was an apparent lytic lesion of the left iliac bone noted on inpatient CT scan, however, this was not noted to be hypermetabolic on his PET CT scan.  Would avoid the addition of a bone modifying agent at this juncture - PET CT scan noted non-FDG avid thickening of the upper esophagus and nonspecific avidity of the stomach which were felt to be indeterminant for which EGD could be considered.  Consider nonurgent GI evaluation in the future. - Reviewed safety precautions and potential use of a cane given recent mechanical fall.  - Requested PET/CT to be done after 3rd cycle of chemoimmunotherapy, to be reviewed at midcycle visit. Printed requisition provided to patient to make appt.  - Continue mid-cycle evaluation after each chemoimmunotherapy cycle.

## 2024-12-11 NOTE — HISTORY OF PRESENT ILLNESS
[Disease: _____________________] : Disease: [unfilled] [AJCC Stage: ____] : AJCC Stage: [unfilled] [de-identified] : Mr. Tomlin is a 62-year-old man, non-smoker, with PMH of HTN and DM2 who presented to [facility] 9/26/24 with decreased intake and altered mental status x 1 week. CT head followed by brain MRI showed a 6.5 x 5.1 x 4.8 cm peripherally enhancing, centrally necrotic mass in the left anterior frontal lobe, with rightward midline shift. 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 On 10/1/24 he underwent left craniotomy for tumor resection. Pathology of the excised left frontal brain tumor was interpreted as showing non-small cell carcinoma compatible with adenocarcinoma of lung origin. PDL1 TPS >95%.  Foundation negative for actionable mutations (+ BRAF G466A, NF1, TP53, among others) and TMB-High. Treated with GK-SRS to brain in late October 2024.  Required ongoing steroids.  Started first line combination chemotherapy and immunotherapy with Carboplatin/Pemetrexed and Pembrolizumab 11/8/24.    [de-identified] : - Left frontal brain tumor excision: 10/1/2024: Metastatic non-small cell carcinoma compatible with adenocarcinoma of lung origin. PD-L1 high-positive > 95%.  Foundation:  + BRAF G466A, NF1, TP53, among others.  TMB-High.  [de-identified] : Mr. Tomlin had left craniotomy and tumor resection 10/1/24 and received GK-SRS from 10/29 - 10/31/24.  He started first line systemic therapy with combination chemotherapy and immunotherapy, with carboplatin, pemetrexed, and pembrolizumab on 11/8/24.  Foundation negative for actionable mutations (+ BRAF G466A, NF1, TP53, among others) and TMB-High.   He is seen now for mid-cycle evaluation after receiving cycle 2 carboplatin, pemetrexed, and pembrolizumab on 11/29/24.  He reports fatigue. Appetite and taste reported ok. Denies F/C, N/V, C/D. Tinnitus bilaterally reported since his surgery.  States he slipped and fell getting off access-a-ride yesterday, denies any injury or hitting his head. Pt states he was not holding handrail and it was rainy. Denies using any assistive device for walking.  Upon reviewing medications, pt states he stopped taking keppra because he ran out two weeks ago.

## 2024-12-11 NOTE — PHYSICAL EXAM
[Restricted in physically strenuous activity but ambulatory and able to carry out work of a light or sedentary nature] : Status 1- Restricted in physically strenuous activity but ambulatory and able to carry out work of a light or sedentary nature, e.g., light house work, office work [Normal] : affect appropriate [de-identified] : No icterus  [de-identified] : MMM O/P Clear [de-identified] : Supple No LAD; No thyromegaly  [de-identified] : Clear [de-identified] : S1 S2 [de-identified] : No edema  [de-identified] : Soft NT/ND No masses [de-identified] : No spine/CVA tenderness  [de-identified] : Ambulatory, steady gait.

## 2024-12-31 NOTE — PHYSICAL EXAM
[Restricted in physically strenuous activity but ambulatory and able to carry out work of a light or sedentary nature] : Status 1- Restricted in physically strenuous activity but ambulatory and able to carry out work of a light or sedentary nature, e.g., light house work, office work [Normal] : affect appropriate [de-identified] : No icterus  [de-identified] : MMM O/P Clear [de-identified] : Supple No LAD; No thyromegaly  [de-identified] : Clear [de-identified] : S1 S2 [de-identified] : No edema  [de-identified] : Soft NT/ND No masses [de-identified] : No spine/CVA tenderness  [de-identified] : Ambulatory, steady gait.

## 2024-12-31 NOTE — HISTORY OF PRESENT ILLNESS
[Disease: _____________________] : Disease: [unfilled] [AJCC Stage: ____] : AJCC Stage: [unfilled] [de-identified] : Mr. Tomlin is a 62-year-old man, non-smoker, with PMH of HTN and DM2 who presented to [facility] 9/26/24 with decreased intake and altered mental status x 1 week. CT head followed by brain MRI showed a 6.5 x 5.1 x 4.8 cm peripherally enhancing, centrally necrotic mass in the left anterior frontal lobe, with rightward midline shift. 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 On 10/1/24 he underwent left craniotomy for tumor resection. Pathology of the excised left frontal brain tumor was interpreted as showing non-small cell carcinoma compatible with adenocarcinoma of lung origin. PDL1 TPS >95%.  Foundation negative for actionable mutations (+ BRAF G466A, NF1, TP53, among others) and TMB-High. Treated with GK-SRS to brain in late October 2024.  Required ongoing steroids.  Started first line combination chemotherapy and immunotherapy with Carboplatin/Pemetrexed and Pembrolizumab 11/8/24.    [de-identified] : - Left frontal brain tumor excision: 10/1/2024: Metastatic non-small cell carcinoma compatible with adenocarcinoma of lung origin. PD-L1 high-positive > 95%.  Foundation:  + BRAF G466A, NF1, TP53, among others.  TMB-High.  [de-identified] : Mr. Tomlin had left craniotomy and tumor resection 10/1/24 and received GK-SRS from 10/29 - 10/31/24.  He started first line systemic therapy with combination chemotherapy and immunotherapy, with carboplatin, pemetrexed, and pembrolizumab on 11/8/24.  Foundation negative for actionable mutations (+ BRAF G466A, NF1, TP53, among others) and TMB-High.   He is seen now for mid-cycle evaluation after receiving cycle 2 carboplatin, pemetrexed, and pembrolizumab on 11/29/24.  He reports fatigue. Appetite and taste reported ok. Denies F/C, N/V, C/D. Tinnitus bilaterally reported since his surgery.  States he slipped and fell getting off access-a-ride yesterday, denies any injury or hitting his head. Pt states he was not holding handrail and it was rainy. Denies using any assistive device for walking.  Upon reviewing medications, pt states he stopped taking keppra because he ran out two weeks ago.

## 2024-12-31 NOTE — PHYSICAL EXAM
[Restricted in physically strenuous activity but ambulatory and able to carry out work of a light or sedentary nature] : Status 1- Restricted in physically strenuous activity but ambulatory and able to carry out work of a light or sedentary nature, e.g., light house work, office work [Normal] : affect appropriate [de-identified] : No icterus  [de-identified] : MMM O/P Clear [de-identified] : Supple No LAD; No thyromegaly  [de-identified] : Clear [de-identified] : S1 S2 [de-identified] : No edema  [de-identified] : Soft NT/ND No masses [de-identified] : No spine/CVA tenderness  [de-identified] : Ambulatory, steady gait.

## 2024-12-31 NOTE — HISTORY OF PRESENT ILLNESS
[Disease: _____________________] : Disease: [unfilled] [AJCC Stage: ____] : AJCC Stage: [unfilled] [de-identified] : Mr. Tomlin is a 62-year-old man, non-smoker, with PMH of HTN and DM2 who presented to [facility] 9/26/24 with decreased intake and altered mental status x 1 week. CT head followed by brain MRI showed a 6.5 x 5.1 x 4.8 cm peripherally enhancing, centrally necrotic mass in the left anterior frontal lobe, with rightward midline shift. 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 On 10/1/24 he underwent left craniotomy for tumor resection. Pathology of the excised left frontal brain tumor was interpreted as showing non-small cell carcinoma compatible with adenocarcinoma of lung origin. PDL1 TPS >95%.  Foundation negative for actionable mutations (+ BRAF G466A, NF1, TP53, among others) and TMB-High. Treated with GK-SRS to brain in late October 2024.  Required ongoing steroids.  Started first line combination chemotherapy and immunotherapy with Carboplatin/Pemetrexed and Pembrolizumab 11/8/24.    [de-identified] : - Left frontal brain tumor excision: 10/1/2024: Metastatic non-small cell carcinoma compatible with adenocarcinoma of lung origin. PD-L1 high-positive > 95%.  Foundation:  + BRAF G466A, NF1, TP53, among others.  TMB-High.  [de-identified] : Mr. Tomlin had left craniotomy and tumor resection 10/1/24 and received GK-SRS from 10/29 - 10/31/24.  He started first line systemic therapy with combination chemotherapy and immunotherapy, with carboplatin, pemetrexed, and pembrolizumab on 11/8/24.  Foundation negative for actionable mutations (+ BRAF G466A, NF1, TP53, among others) and TMB-High.   He is seen now for mid-cycle evaluation after receiving cycle 2 carboplatin, pemetrexed, and pembrolizumab on 11/29/24.  He reports fatigue. Appetite and taste reported ok. Denies F/C, N/V, C/D. Tinnitus bilaterally reported since his surgery.  States he slipped and fell getting off access-a-ride yesterday, denies any injury or hitting his head. Pt states he was not holding handrail and it was rainy. Denies using any assistive device for walking.  Upon reviewing medications, pt states he stopped taking keppra because he ran out two weeks ago.

## 2025-01-21 NOTE — REASON FOR VISIT
[Routine Follow-Up] : routine follow-up visit for [Brain Metastasis] : brain metastasis [Spouse] : spouse

## 2025-01-23 NOTE — PHYSICAL EXAM
[Normal] : no focal deficits [] : no respiratory distress [Exaggerated Use Of Accessory Muscles For Inspiration] : no accessory muscle use [Heart Rate And Rhythm] : heart rate and rhythm were normal [Arterial Pulses Normal] : the arterial pulses were normal [Abdomen Soft] : soft [Nondistended] : nondistended [Nail Clubbing] : no clubbing  or cyanosis of the fingernails [Oriented To Time, Place, And Person] : oriented to person, place, and time

## 2025-01-26 NOTE — REVIEW OF SYSTEMS
[Fatigue] : fatigue [Recent Change In Weight] : ~T recent weight change [Confused] : confusion [Negative] : Allergic/Immunologic [Blurred Vision: Grade 0] : Blurred Vision: Grade 0 [Mucositis Oral: Grade 0] : Mucositis Oral: Grade 0  [Xerostomia: Grade 0] : Xerostomia: Grade 0 [Oral Pain: Grade 0] : Oral Pain: Grade 0 [Concentration Impairment: Grade 0] : Concentration Impairment: Grade 0 [Dizziness: Grade 0] : Dizziness: Grade 0  [Headache: Grade 0] : Headache: Grade 0 [Lethargy: Grade 0] : Lethargy: Grade 0 [Fever] : no fever [Chills] : no chills [Night Sweats] : no night sweats [Dizziness] : no dizziness [Fainting] : no fainting [Difficulty Walking] : no difficulty walking [FreeTextEntry2] : 10 to 15 pounds weight loss [de-identified] : slight confusion or memory

## 2025-01-26 NOTE — HISTORY OF PRESENT ILLNESS
[FreeTextEntry1] : Rt hx: GKRS to LEFT frontal met 24Gy over 3Fxs 10/29-10/31/2024  INITIAL CONSULTATION: OCTOBER 17, 2024 This is a 61 y/o M with a h/o HTN, DM2 who Presents for consultation to discuss the possible role of radiation therapy in his care.   The course of illness began when he presented to [facility] with altered mental status in September 2024. Reports he was having difficulty participating in work related activities thus he stopped going, also with decreased appetite and was staying in bed most days. Family also noted he was not his usual self.  During workup in the ER [facility] on 9/28/2024 appreciated an INTRA-AXIAL: 6.1 x 5.0 x 3.8 cm mostly hypoattenuating mass lesion in the left anterior frontal lobe crossing the anterior falx into the medial right temporal lobe with surrounding vasogenic edema. There is severe mass effect on the surrounding parenchyma and on the ventricular system with marked left-to-right midline shift of 1.6 cm. Metastatic workup was completed to include CT chest/abdomen on said day IMPRESSION: Large left lung mass concerning for primary lung neoplasm.1.3 cm gastrohepatic lymph node, and 1.4 cm lytic lesion in the left iliac bone concerning for distal metastasis.  MRI brain w w/o contrast 9/30/2024IMPRESSION:6.5 x 5.1 x 4.8 cm (maximal AP x TV x CC dimensions) peripherally enhancing, centrally necrotic mass within the left anterior frontal lobe demonstrating regions of restricted diffusion. Findings are suspicious for the presence of glioblastoma multiform. Differential diagnosis includes metastatic disease. Rightward midline shift of 1 cm. Rightward subfalcine herniation of 1.6 cm.  Patient was admitted for treatment and ultimately underwent a Left frontotemporal craniotomy and excision of tumor with MR stereotactic and ultrasonic guidance with Dr. Ty Reyes on 10/1/2024 PATH: Left frontal tumor on dura, biopsy Metastatic non-small cell carcinoma, compatible with adenocarcinoma of lung origin   MRI brain w w/o contrast 10/2/2024 IMPRESSION: Postsurgical changes in the left frontal region status post left frontal craniotomy and mass resection. Left frontal vasogenic edema with mass effect on the left lateral ventricle and approximately 1.0 cm of rightward midline shift similar to presurgical exam. Focal plaque like region of enhancement in the posterior aspect of the resection cavity possibly representing residual enhancing tumor versus postoperative changes. Recommend continued follow-up.  VISIT DATED: 10/17/2024 Patient presents for consult with Dr. Robb. Pt reports that he has lost 10 to 15 pounds since diagnosis. He does not sleep at night and doesn't have much pain. Spouse reports that he has unstable gait but the pt denies it. Pt used to be a smoker before the surgery smoking cigarettes for a couple years and switching over to cigars. Pt stiches are intact no redness or swelling surrounding the area. Pt wants to remain positive during this of treatment. Reports  Patient scheduled with Dr. Josh Roman (heme/onco) 10/18/2024  VISIT DATED: 1/23/2025 Mr. Tomlin presents for progress check and evaluation he is s/p GKRS to LEFT frontal met 24Gy over 3Fxs 10/29-10/31/2024  1/21/2025 MRI Brain Postsurgical changes in the left frontal region status post left frontal craniotomy and mass resection. Left frontal vasogenic edema with mass effect on the left lateral ventricle and approximately 1.0 cm of rightward midline shift similar to presurgical exam. Focal plaque like region of enhancement in the posterior aspect of the resection cavity possibly representing residual enhancing tumor versus postoperative changes. Recommend continued follow-up.  Follows with Dr. Roman and started first-line systemic therapy with combination of chemotherapy and immunotherapy with carboplatin, pemetrexed, and pembrolizumab on 11/8/24. PET ct  December 2024 12/23/24 PET/CT Compared with PET/CT 10/16/2024, there has been interval response to therapy with residual FDG avid disease evident  12/21/25 MRI Brain Final read pending however on personal review of imaging, there is improvement in size and ring rim enhancement compatible with interval response to therapy.   He denies any seizure episodes or headaches, his wife reports he still has some episodes of subtle confusion, he reports weakness in the left lower extremity and falls twice since surgery.

## 2025-01-26 NOTE — HISTORY OF PRESENT ILLNESS
[FreeTextEntry1] : Rt hx: GKRS to LEFT frontal met 24Gy over 3Fxs 10/29-10/31/2024  INITIAL CONSULTATION: OCTOBER 17, 2024 This is a 63 y/o M with a h/o HTN, DM2 who Presents for consultation to discuss the possible role of radiation therapy in his care.   The course of illness began when he presented to [facility] with altered mental status in September 2024. Reports he was having difficulty participating in work related activities thus he stopped going, also with decreased appetite and was staying in bed most days. Family also noted he was not his usual self.  During workup in the ER [facility] on 9/28/2024 appreciated an INTRA-AXIAL: 6.1 x 5.0 x 3.8 cm mostly hypoattenuating mass lesion in the left anterior frontal lobe crossing the anterior falx into the medial right temporal lobe with surrounding vasogenic edema. There is severe mass effect on the surrounding parenchyma and on the ventricular system with marked left-to-right midline shift of 1.6 cm. Metastatic workup was completed to include CT chest/abdomen on said day IMPRESSION: Large left lung mass concerning for primary lung neoplasm.1.3 cm gastrohepatic lymph node, and 1.4 cm lytic lesion in the left iliac bone concerning for distal metastasis.  MRI brain w w/o contrast 9/30/2024IMPRESSION:6.5 x 5.1 x 4.8 cm (maximal AP x TV x CC dimensions) peripherally enhancing, centrally necrotic mass within the left anterior frontal lobe demonstrating regions of restricted diffusion. Findings are suspicious for the presence of glioblastoma multiform. Differential diagnosis includes metastatic disease. Rightward midline shift of 1 cm. Rightward subfalcine herniation of 1.6 cm.  Patient was admitted for treatment and ultimately underwent a Left frontotemporal craniotomy and excision of tumor with MR stereotactic and ultrasonic guidance with Dr. Ty Reyes on 10/1/2024 PATH: Left frontal tumor on dura, biopsy Metastatic non-small cell carcinoma, compatible with adenocarcinoma of lung origin   MRI brain w w/o contrast 10/2/2024 IMPRESSION: Postsurgical changes in the left frontal region status post left frontal craniotomy and mass resection. Left frontal vasogenic edema with mass effect on the left lateral ventricle and approximately 1.0 cm of rightward midline shift similar to presurgical exam. Focal plaque like region of enhancement in the posterior aspect of the resection cavity possibly representing residual enhancing tumor versus postoperative changes. Recommend continued follow-up.  VISIT DATED: 10/17/2024 Patient presents for consult with Dr. Robb. Pt reports that he has lost 10 to 15 pounds since diagnosis. He does not sleep at night and doesn't have much pain. Spouse reports that he has unstable gait but the pt denies it. Pt used to be a smoker before the surgery smoking cigarettes for a couple years and switching over to cigars. Pt stiches are intact no redness or swelling surrounding the area. Pt wants to remain positive during this of treatment. Reports  Patient scheduled with Dr. Josh Roman (heme/onco) 10/18/2024  VISIT DATED: 1/23/2025 Mr. Tomlin presents for progress check and evaluation he is s/p GKRS to LEFT frontal met 24Gy over 3Fxs 10/29-10/31/2024  1/21/2025 MRI Brain Postsurgical changes in the left frontal region status post left frontal craniotomy and mass resection. Left frontal vasogenic edema with mass effect on the left lateral ventricle and approximately 1.0 cm of rightward midline shift similar to presurgical exam. Focal plaque like region of enhancement in the posterior aspect of the resection cavity possibly representing residual enhancing tumor versus postoperative changes. Recommend continued follow-up.  Follows with Dr. Roman and started first-line systemic therapy with combination of chemotherapy and immunotherapy with carboplatin, pemetrexed, and pembrolizumab on 11/8/24. PET ct  December 2024 12/23/24 PET/CT Compared with PET/CT 10/16/2024, there has been interval response to therapy with residual FDG avid disease evident  12/21/25 MRI Brain Final read pending however on personal review of imaging, there is improvement in size and ring rim enhancement compatible with interval response to therapy.   He denies any seizure episodes or headaches, his wife reports he still has some episodes of subtle confusion, he reports weakness in the left lower extremity and falls twice since surgery.

## 2025-01-26 NOTE — HISTORY OF PRESENT ILLNESS
[FreeTextEntry1] : Rt hx: GKRS to LEFT frontal met 24Gy over 3Fxs 10/29-10/31/2024  INITIAL CONSULTATION: OCTOBER 17, 2024 This is a 61 y/o M with a h/o HTN, DM2 who Presents for consultation to discuss the possible role of radiation therapy in his care.   The course of illness began when he presented to [facility] with altered mental status in September 2024. Reports he was having difficulty participating in work related activities thus he stopped going, also with decreased appetite and was staying in bed most days. Family also noted he was not his usual self.  During workup in the ER [facility] on 9/28/2024 appreciated an INTRA-AXIAL: 6.1 x 5.0 x 3.8 cm mostly hypoattenuating mass lesion in the left anterior frontal lobe crossing the anterior falx into the medial right temporal lobe with surrounding vasogenic edema. There is severe mass effect on the surrounding parenchyma and on the ventricular system with marked left-to-right midline shift of 1.6 cm. Metastatic workup was completed to include CT chest/abdomen on said day IMPRESSION: Large left lung mass concerning for primary lung neoplasm.1.3 cm gastrohepatic lymph node, and 1.4 cm lytic lesion in the left iliac bone concerning for distal metastasis.  MRI brain w w/o contrast 9/30/2024IMPRESSION:6.5 x 5.1 x 4.8 cm (maximal AP x TV x CC dimensions) peripherally enhancing, centrally necrotic mass within the left anterior frontal lobe demonstrating regions of restricted diffusion. Findings are suspicious for the presence of glioblastoma multiform. Differential diagnosis includes metastatic disease. Rightward midline shift of 1 cm. Rightward subfalcine herniation of 1.6 cm.  Patient was admitted for treatment and ultimately underwent a Left frontotemporal craniotomy and excision of tumor with MR stereotactic and ultrasonic guidance with Dr. Ty Reyes on 10/1/2024 PATH: Left frontal tumor on dura, biopsy Metastatic non-small cell carcinoma, compatible with adenocarcinoma of lung origin   MRI brain w w/o contrast 10/2/2024 IMPRESSION: Postsurgical changes in the left frontal region status post left frontal craniotomy and mass resection. Left frontal vasogenic edema with mass effect on the left lateral ventricle and approximately 1.0 cm of rightward midline shift similar to presurgical exam. Focal plaque like region of enhancement in the posterior aspect of the resection cavity possibly representing residual enhancing tumor versus postoperative changes. Recommend continued follow-up.  VISIT DATED: 10/17/2024 Patient presents for consult with Dr. Robb. Pt reports that he has lost 10 to 15 pounds since diagnosis. He does not sleep at night and doesn't have much pain. Spouse reports that he has unstable gait but the pt denies it. Pt used to be a smoker before the surgery smoking cigarettes for a couple years and switching over to cigars. Pt stiches are intact no redness or swelling surrounding the area. Pt wants to remain positive during this of treatment. Reports  Patient scheduled with Dr. Josh Roman (heme/onco) 10/18/2024  VISIT DATED: 1/23/2025 Mr. Tomlin presents for progress check and evaluation he is s/p GKRS to LEFT frontal met 24Gy over 3Fxs 10/29-10/31/2024  1/21/2025 MRI Brain Postsurgical changes in the left frontal region status post left frontal craniotomy and mass resection. Left frontal vasogenic edema with mass effect on the left lateral ventricle and approximately 1.0 cm of rightward midline shift similar to presurgical exam. Focal plaque like region of enhancement in the posterior aspect of the resection cavity possibly representing residual enhancing tumor versus postoperative changes. Recommend continued follow-up.  Follows with Dr. Roman and started first-line systemic therapy with combination of chemotherapy and immunotherapy with carboplatin, pemetrexed, and pembrolizumab on 11/8/24. PET ct  December 2024 12/23/24 PET/CT Compared with PET/CT 10/16/2024, there has been interval response to therapy with residual FDG avid disease evident  12/21/25 MRI Brain Final read pending however on personal review of imaging, there is improvement in size and ring rim enhancement compatible with interval response to therapy.   He denies any seizure episodes or headaches, his wife reports he still has some episodes of subtle confusion, he reports weakness in the left lower extremity and falls twice since surgery.

## 2025-01-26 NOTE — REVIEW OF SYSTEMS
[Fatigue] : fatigue [Recent Change In Weight] : ~T recent weight change [Confused] : confusion [Negative] : Allergic/Immunologic [Blurred Vision: Grade 0] : Blurred Vision: Grade 0 [Mucositis Oral: Grade 0] : Mucositis Oral: Grade 0  [Xerostomia: Grade 0] : Xerostomia: Grade 0 [Oral Pain: Grade 0] : Oral Pain: Grade 0 [Concentration Impairment: Grade 0] : Concentration Impairment: Grade 0 [Dizziness: Grade 0] : Dizziness: Grade 0  [Headache: Grade 0] : Headache: Grade 0 [Lethargy: Grade 0] : Lethargy: Grade 0 [Fever] : no fever [Chills] : no chills [Night Sweats] : no night sweats [Dizziness] : no dizziness [Fainting] : no fainting [Difficulty Walking] : no difficulty walking [FreeTextEntry2] : 10 to 15 pounds weight loss [de-identified] : slight confusion or memory

## 2025-01-26 NOTE — REVIEW OF SYSTEMS
[Fatigue] : fatigue [Recent Change In Weight] : ~T recent weight change [Confused] : confusion [Negative] : Allergic/Immunologic [Blurred Vision: Grade 0] : Blurred Vision: Grade 0 [Mucositis Oral: Grade 0] : Mucositis Oral: Grade 0  [Xerostomia: Grade 0] : Xerostomia: Grade 0 [Oral Pain: Grade 0] : Oral Pain: Grade 0 [Concentration Impairment: Grade 0] : Concentration Impairment: Grade 0 [Dizziness: Grade 0] : Dizziness: Grade 0  [Headache: Grade 0] : Headache: Grade 0 [Lethargy: Grade 0] : Lethargy: Grade 0 [Fever] : no fever [Chills] : no chills [Night Sweats] : no night sweats [Dizziness] : no dizziness [Fainting] : no fainting [Difficulty Walking] : no difficulty walking [FreeTextEntry2] : 10 to 15 pounds weight loss [de-identified] : slight confusion or memory

## 2025-01-27 NOTE — RESULTS/DATA
[FreeTextEntry1] : - CT C/A/P with contrast 9/26/24: Large left lung mass concerning for primary lung neoplasm. 1.3 cm gastrohepatic lymph node, and 1.4cm lytic lesion in the left iliac bone concerning for distal metastasis.   - CT Head without contrast: 9/26/24: 6.1 x 5 x 3.8 cm mostly hypoattenuating mass lesion in the left anterior frontal lobe, crossing the anterior falx into the medial right temporal lobe with surrounding vasogenic edema. There is severe mass effect on the surrounding parenchyma and on the ventricular system with marked left-to-right midline shift of 1.6cm. Recommend contrast-enhanced MRI brain for further characterization.   - MRI brain 9/30/24: 6.5 x 5.1 x 4.8 cm peripherally enhancing, centrally necrotic mass within the left anterior frontal lobe demonstrating regions of restricted diffusion. Findings are suspicious for the presence of glioblastoma multiform. Differential diagnosis includes metastatic disease.  Right midline shift of 1cm. Rightward subfalcine herniation of 1.6cm. Basal cisterns are visualized. Mild asymmetric dilation of the right lateral ventricle.   - Surgical Pathology 10/1/24: 1. Left frontal tumor on dura, biopsy: Metastatic non-small cell carcinoma, compatible with adenocarcinoma of lung origin. 2. Left frontal tumor on dura, biopsy: Metastatic non-small cell carcinoma, compatible with adenocarcinoma of lung origin. 3. Left frontal brain tumor on dura, excision: Metastatic non-small cell carcinoma, compatible with adenocarcinoma of lung origin.  Note: Sections show a high-grade malignant neoplasm with predominantly solid growth and scattered foci of necrosis.  IHC: neoplastic cells are diffusely positive for high-molecular weight (HMW), cytokeratin, CK7, TTF-1, and p63, and are negative for CK20, GATA3, p40, NKX3.1, and PAAX8. GFAP highlights background brain parenchyma.  PDL1 TPS >95%.   - Cytology 10/1/24: Brain, left, frontal, fluid: Positive for malignant cells. Carcinoma.   - CT head without contrast 10/2/24: Postoperative changes compatible with left frontal craniotomy. Some low attenuation identified in the postop region with some associated air as well as subtle high attenuation. this area of high changes compatible areas of hemorrhage on postop material. Surrounding edema is again seen. Decreased mass effect on the anteior aspect of both lateral ventricle is seen. Residual right left shift is seen (1.1cm).   -MR Brain 10/2/24: Postsurgical changes in the left frontal region status post left frontal craniotomy and mass resection.  Left frontal vasogenic edema with mass effect on the left lateral ventricle and approximately 1cm of rightward midline shift similar to presurgical exam.  Focal plaque like region of enhancement in the posterior aspect of the resection cavity possibly representing residual enhancing tumor versus postoperative changes. Recommend continued follow-up.   - PET/CT 10/16/24:  1. Large peripherally FDG-avid, centrally photopenic multiloculated mass in the left perihilar/paramediastinal left upper lobe corresponds to known malignancy. Separate FDG-avid left upper lobe nodule is compatible with metastasis.  2. FDG-avid left perihilar, bilateral mediastinal, bilateral cervical, and distal right external iliac lymph nodes. The left perihilar and AP window lymph nodes are compatible with metastatic disease. Small FDG-avid bilateral mediastinal/cervical and right external iliac lymph nodes are indeterminate. Differential diagnosis includes inflammatory and/or neoplastic etiologies. Cervical/supraclavicular lymph nodes are accessible to an ultrasound-guided percutaneous needle biopsy.  3. Enlarged prostate gland with right greater than left hypermetabolism. Differential diagnosis includes infectious/inflammatory etiologies and prostate carcinoma. Urology consultation is recommended.  4. Nonspecific FDG-avid thickening of upper esophagus and nonspecific hypermetabolism of fundus and body of stomach, most intense along the lesser curvature, are indeterminate. Endoscopy is recommended to exclude neoplasm.  5. Mild, diffuse hypermetabolism in nonenlarged thyroid gland suggests thyroiditis. Please correlate clinically.  -PET/CT 12/23/24:   1. Compared with PET/CT 10/16/2024, there has been interval response to therapy with residual FDG avid disease evident. 2. Resolution of FDG avid cervical lymph nodes. 3. Decrease in number of FDG avid lymph nodes in the mediastinum and hilum. Small lymph nodes in the AP window remain evident, with mild residual FDG uptake. 4. Decrease in size of a left perihilar mass and left upper lobe mass, with decrease in extent of FDG avidity and with focal areas of increasing FDG avidity when compared to prior exam. 5. Similar size and mildly decreased activity in an apical left upper lobe nodule. 6. Decrease in size and FDG uptake in the prostate, possibly due to resolving infection. Decrease in FDG avidity of a right external iliac lymph node, which may have been reactive. Recommend urology consultation if not previously performed. 7. Resolution of FDG avidity in a thickened upper esophagus. Increase in nonspecific hypermetabolism in the stomach. Recommend correlation with endoscopy. 8. Similar mild diffuse hypermetabolism in the thyroid. This can be correlated with TSH.

## 2025-01-27 NOTE — HISTORY OF PRESENT ILLNESS
[Disease: _____________________] : Disease: [unfilled] [AJCC Stage: ____] : AJCC Stage: [unfilled] [de-identified] : Mr. Tomlin is a 62-year-old man, non-smoker, with PMH of HTN and DM2 who presented to [facility] 9/26/24 with decreased intake and altered mental status x 1 week. CT head followed by brain MRI showed a 6.5 x 5.1 x 4.8 cm peripherally enhancing, centrally necrotic mass in the left anterior frontal lobe, with rightward midline shift. 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 On 10/1/24 he underwent left craniotomy for tumor resection. Pathology of the excised left frontal brain tumor was interpreted as showing non-small cell carcinoma compatible with adenocarcinoma of lung origin. PDL1 TPS >95%.  Foundation negative for actionable mutations (+ BRAF G466A, NF1, TP53, among others) and TMB-High. Treated with GK-SRS to brain in late October 2024.  Started first line combination chemotherapy and immunotherapy with Carboplatin/Pemetrexed and Pembrolizumab 11/8/24.  Achieved CT.    [de-identified] : - Left frontal brain tumor excision: 10/1/2024: Metastatic non-small cell carcinoma compatible with adenocarcinoma of lung origin. PD-L1 high-positive > 95%.  Foundation:  + BRAF G466A, NF1, TP53, among others.  TMB-High.  [de-identified] : Mr. Tomlin had left craniotomy and tumor resection 10/1/24 and received post-op GK-SRS from 10/29 - 10/31/24.  He started first line systemic therapy with combination chemotherapy and immunotherapy, with carboplatin, pemetrexed, and pembrolizumab on 11/8/24; achieved TN.   Patient presents today with his wife status post C4 of systemic therapy on 1/10/25. He reports ongoing fatigue.  Denies F/C/N/V/D. He completed course of OT.  Started PT. Wife reports that patient is stressed; discussed psycho-oncology referral which he will consider. Patient considering new PCP; provided phone number for [facility]. Encouraged him to follow through with GI evaluation. Brain MRI from this month results are pending at time of visit.

## 2025-01-27 NOTE — PHYSICAL EXAM
[Restricted in physically strenuous activity but ambulatory and able to carry out work of a light or sedentary nature] : Status 1- Restricted in physically strenuous activity but ambulatory and able to carry out work of a light or sedentary nature, e.g., light house work, office work [Normal] : affect appropriate [de-identified] : No icterus  [de-identified] : MMM O/P Clear [de-identified] : Supple No LAD [de-identified] : Clear [de-identified] : S1 S2 [de-identified] : No edema  [de-identified] : Soft NT/ND No masses [de-identified] : No spine/CVA tenderness  [de-identified] : Ambulatory

## 2025-01-27 NOTE — ASSESSMENT
[FreeTextEntry1] : 62M former smoker with Stage IV/metastatic NSCLC with adenocarcinoma histology with a large ABAD primary tumor with evidence of metastatic disease involving the intrathoracic lymph nodes, cervical lymph nodes and brain.    Patient is status post craniotomy resection of a large left frontal brain tumor metastasis October 2024.  Tumor tested PD-L1 high-positive and Foundation negative for actionable mutations (+ BRAF G466A, NF1, TP53, among others) and TMB-High. Received post-op GK-SRS in late-October 2024. He started first-line systemic therapy with combination of chemotherapy and immunotherapy with carboplatin, pemetrexed, and pembrolizumab on 11/8/24; achieved nice KS.  Restaging PET/CT 12/23/24 with nice deep response to interval therapy.  Now s/p C4 on 1/10 Recommend: - Continue first-line systemic therapy as scheduled.  Now that he has completed 4 cycles of triplet therapy, will discontinue the carboplatin and proceed with pemetrexed/pembrolizumab maintenance.  For C5 next week. - Continue dexamethasone in the stacey-chemo setting the day before and the day after chemo. Continue folic acid 1mg by mouth daily and vitamin B12 injection every 9 weeks.  - Repeat labs today.  Monitor periodic TFT's while on immunotherapy - Continue follow up with Dr. Reyes and Dr. Robb, s/p craniotomy resection of large frontal tumor 10/1/24 and s/p GK-SRS completed 10/31/24. F/U [facility] Brain MRI results  - Patient stopped the Keppra after running out of medication - Normal screening PSA which was checked given the PET CT scan findings involving the prostate. No immediate need for urology follow up.  - There was an apparent lytic lesion of the left iliac bone noted on inpatient CT scan, however, this was not noted to be hypermetabolic on his PET CT scan.  Would avoid the addition of a bone modifying agent at this juncture - PET CT scan noted non-FDG avid thickening of the upper esophagus and nonspecific avidity of the stomach which were felt to be indeterminant for which EGD could be considered.  Encouraged him to see GI for possible EGD  -Patient to consider psycho-onc referral  - Follow-up pretreatment (same day) beginning with C6. Plan to obtain his next restaging scan prior to his mid March follow-up visit with MD

## 2025-01-27 NOTE — PHYSICAL EXAM
[Restricted in physically strenuous activity but ambulatory and able to carry out work of a light or sedentary nature] : Status 1- Restricted in physically strenuous activity but ambulatory and able to carry out work of a light or sedentary nature, e.g., light house work, office work [Normal] : affect appropriate [de-identified] : No icterus  [de-identified] : MMM O/P Clear [de-identified] : Supple No LAD [de-identified] : Clear [de-identified] : S1 S2 [de-identified] : No edema  [de-identified] : Soft NT/ND No masses [de-identified] : No spine/CVA tenderness  [de-identified] : Ambulatory

## 2025-01-27 NOTE — HISTORY OF PRESENT ILLNESS
[Disease: _____________________] : Disease: [unfilled] [AJCC Stage: ____] : AJCC Stage: [unfilled] [de-identified] : Mr. Tomlin is a 62-year-old man, non-smoker, with PMH of HTN and DM2 who presented to [facility] 9/26/24 with decreased intake and altered mental status x 1 week. CT head followed by brain MRI showed a 6.5 x 5.1 x 4.8 cm peripherally enhancing, centrally necrotic mass in the left anterior frontal lobe, with rightward midline shift. 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 On 10/1/24 he underwent left craniotomy for tumor resection. Pathology of the excised left frontal brain tumor was interpreted as showing non-small cell carcinoma compatible with adenocarcinoma of lung origin. PDL1 TPS >95%.  Foundation negative for actionable mutations (+ BRAF G466A, NF1, TP53, among others) and TMB-High. Treated with GK-SRS to brain in late October 2024.  Started first line combination chemotherapy and immunotherapy with Carboplatin/Pemetrexed and Pembrolizumab 11/8/24.  Achieved [place].    [de-identified] : - Left frontal brain tumor excision: 10/1/2024: Metastatic non-small cell carcinoma compatible with adenocarcinoma of lung origin. PD-L1 high-positive > 95%.  Foundation:  + BRAF G466A, NF1, TP53, among others.  TMB-High.  [de-identified] : Mr. Tomlin had left craniotomy and tumor resection 10/1/24 and received post-op GK-SRS from 10/29 - 10/31/24.  He started first line systemic therapy with combination chemotherapy and immunotherapy, with carboplatin, pemetrexed, and pembrolizumab on 11/8/24; achieved [place].   Patient presents today with his wife status post C4 of systemic therapy on 1/10/25. He reports ongoing fatigue.  Denies F/C/N/V/D. He completed course of OT.  Started PT. Wife reports that patient is stressed; discussed psycho-oncology referral which he will consider. Patient considering new PCP; provided phone number for [facility]. Encouraged him to follow through with GI evaluation. Brain MRI from this month results are pending at time of visit.

## 2025-01-27 NOTE — ASSESSMENT
[FreeTextEntry1] : 62M former smoker with Stage IV/metastatic NSCLC with adenocarcinoma histology with a large ABAD primary tumor with evidence of metastatic disease involving the intrathoracic lymph nodes, cervical lymph nodes and brain.    Patient is status post craniotomy resection of a large left frontal brain tumor metastasis October 2024.  Tumor tested PD-L1 high-positive and Foundation negative for actionable mutations (+ BRAF G466A, NF1, TP53, among others) and TMB-High. Received post-op GK-SRS in late-October 2024. He started first-line systemic therapy with combination of chemotherapy and immunotherapy with carboplatin, pemetrexed, and pembrolizumab on 11/8/24; achieved nice [place].  Restaging PET/CT 12/23/24 with nice deep response to interval therapy.  Now s/p C4 on 1/10 Recommend: - Continue first-line systemic therapy as scheduled.  Now that he has completed 4 cycles of triplet therapy, will discontinue the carboplatin and proceed with pemetrexed/pembrolizumab maintenance.  For C5 next week. - Continue dexamethasone in the stacey-chemo setting the day before and the day after chemo. Continue folic acid 1mg by mouth daily and vitamin B12 injection every 9 weeks.  - Repeat labs today.  Monitor periodic TFT's while on immunotherapy - Continue follow up with Dr. Reyes and Dr. Robb, s/p craniotomy resection of large frontal tumor 10/1/24 and s/p GK-SRS completed 10/31/24. F/U [facility] Brain MRI results  - Patient stopped the Keppra after running out of medication - Normal screening PSA which was checked given the PET CT scan findings involving the prostate. No immediate need for urology follow up.  - There was an apparent lytic lesion of the left iliac bone noted on inpatient CT scan, however, this was not noted to be hypermetabolic on his PET CT scan.  Would avoid the addition of a bone modifying agent at this juncture - PET CT scan noted non-FDG avid thickening of the upper esophagus and nonspecific avidity of the stomach which were felt to be indeterminant for which EGD could be considered.  Encouraged him to see GI for possible EGD  -Patient to consider psycho-onc referral  - Follow-up pretreatment (same day) beginning with C6. Plan to obtain his next restaging scan prior to his mid March follow-up visit with MD

## 2025-02-02 NOTE — HISTORY OF PRESENT ILLNESS
[FreeTextEntry1] : Pt is s/p resection of a large left frontal NSLC (pt has left lung mass) on Oct 1. He had initial speech affectation and confusion. Seen by Oncology who is starting Ketruda and possible chemo for NSLC. Pt saw Dr. Robb last Thursday who will arrange SRS, hypo fractionated, for tumor region Pt states he is not feeling too bad. Every once in a while he gets a twinge in the surgical area. Pt speech is not to bad. Pt feels a little slower than before. No headaches per se except for the twinges. Pt is off the steroid medication. Pt is still on Keppra 500 mg bid. Pt is going to get outpatient OT and PT. He is doing well right now with few symptoms.  1/31/25: pt arrives for 3 month f/u, Hx 10/1/24 Left frontal craniotomy resection of metastatic NSC carcinoma compatible with adenocarcinoma of lung origin..  Following with Dr. Robb for NSLC stage 4 currently in chemo treatment at 450.  Ambulating with cane, RLE 4+/5 hip flexor weakness, otherwise full strength.

## 2025-02-02 NOTE — PHYSICAL EXAM
[General Appearance - Alert] : alert [General Appearance - In No Acute Distress] : in no acute distress [Oriented To Time, Place, And Person] : oriented to person, place, and time [Impaired Insight] : insight and judgment were intact [Sclera] : the sclera and conjunctiva were normal [Hearing Threshold Finger Rub Not [place]] : hearing was normal [Neck Appearance] : the appearance of the neck was normal [] : no respiratory distress [Involuntary Movements] : no involuntary movements were seen [Skin Color & Pigmentation] : normal skin color and pigmentation [Motor Tone] : muscle tone was normal in all four extremities [Motor Strength] : muscle strength was normal in all four extremities [FreeTextEntry6] : MUNGUIA with no drift, good . [FreeTextEntry8] : amb with mildy unsteady gait, RLE proximal weakness 4/5 otherwise full strength  [FreeTextEntry1] : amb with mildy unsteady gait, RLE proximal weakness 4/5 otherwise full strength

## 2025-02-02 NOTE — RESULTS/DATA
[FreeTextEntry1] : EXAM: [id number] - MR BRAIN WAW IC  - ORDERED BY: LISA LIANBAMBI  PROCEDURE DATE:  01/21/2025   INTERPRETATION:  MR BRAIN WITHOUT AND WITH CONTRAST  TECHNIQUE: Multiplanar multisequence imaging of the brain was performed before and after the administration of intravenous contrast.  CONTRAST: 6.5 mL gadavist was administered. 1 mL was discarded.  CLINICAL INDICATION: Malignant neoplasm metastatic to brain. COMPARISON: MR brain without and with contrast 10/29/2024, 10/2/2024.. ____________________ FINDINGS:  Status post left frontotemporal craniotomy with postsurgical changes in the skull/scalp as before. Old blood subjacent the craniotomy site. Small extra-axial fluid collection subjacent the craniotomy site in the left frontal convexity. There is an additional small extra-axial collection in the left frontal convexity/adjacent to the left anterior falx. There is minimal rightward midline shift, improved in comparison to 10/29/2024. Interval collapse of the left frontal resection bed. The left frontal resection bed now measures approximately 1.9 x 1.3 cm and has T2 hyperintense T1 hypointense signal characteristics. There is old blood at the margins of the resection bed. Interval decreased nodular enhancement at the margins of the left frontal resection bed in comparison to 10/29/2024. There is an indeterminate 6 mm focus of nodular enhancement at the posterior margin of the left frontal resection bed. There is a mild amount of T2/FLAIR hyperintense signal in the left frontal lobe at the margins of the resection bed, improved in comparison to 10/2/2024.  No new suspicious foci of intracranial enhancement.  Partially empty sella. No hydrocephalus. Normal course and caliber of the major intracranial flow voids. Similar prominent left posterior fossa extra-axial space. No foci of parenchymal restricted diffusion. No findings suspicious for an acute intracranial hemorrhage.  Paranasal Sinuses and Orbits: Minimal mucosal thickening of the maxillary sinuses and of multiple ethmoid air cells. Orbits are unremarkable. ____________________ IMPRESSION:  1.  No findings suspicious for tumor progression. Indeterminate 6 mm focus of nodular enhancement at the posterior margin of the left frontal resection bed. Attention on follow-up exams is advised. No findings suspicious for new sites of metastatic disease.  --- End of Report ---

## 2025-02-02 NOTE — REVIEW OF SYSTEMS
[Feeling Tired] : feeling tired [As Noted in HPI] : as noted in HPI [Leg Weakness] : leg weakness [Negative] : Heme/Lymph [FreeTextEntry2] : in chemo treatment [de-identified] : RLE proximal 4/5

## 2025-02-02 NOTE — RESULTS/DATA
[FreeTextEntry1] : EXAM: [id number] - MR BRAIN WAW IC  - ORDERED BY: LISA LIANBAMBI  PROCEDURE DATE:  01/21/2025   INTERPRETATION:  MR BRAIN WITHOUT AND WITH CONTRAST  TECHNIQUE: Multiplanar multisequence imaging of the brain was performed before and after the administration of intravenous contrast.  CONTRAST: 6.5 mL gadavist was administered. 1 mL was discarded.  CLINICAL INDICATION: Malignant neoplasm metastatic to brain. COMPARISON: MR brain without and with contrast 10/29/2024, 10/2/2024.. ____________________ FINDINGS:  Status post left frontotemporal craniotomy with postsurgical changes in the skull/scalp as before. Old blood subjacent the craniotomy site. Small extra-axial fluid collection subjacent the craniotomy site in the left frontal convexity. There is an additional small extra-axial collection in the left frontal convexity/adjacent to the left anterior falx. There is minimal rightward midline shift, improved in comparison to 10/29/2024. Interval collapse of the left frontal resection bed. The left frontal resection bed now measures approximately 1.9 x 1.3 cm and has T2 hyperintense T1 hypointense signal characteristics. There is old blood at the margins of the resection bed. Interval decreased nodular enhancement at the margins of the left frontal resection bed in comparison to 10/29/2024. There is an indeterminate 6 mm focus of nodular enhancement at the posterior margin of the left frontal resection bed. There is a mild amount of T2/FLAIR hyperintense signal in the left frontal lobe at the margins of the resection bed, improved in comparison to 10/2/2024.  No new suspicious foci of intracranial enhancement.  Partially empty sella. No hydrocephalus. Normal course and caliber of the major intracranial flow voids. Similar prominent left posterior fossa extra-axial space. No foci of parenchymal restricted diffusion. No findings suspicious for an acute intracranial hemorrhage.  Paranasal Sinuses and Orbits: Minimal mucosal thickening of the maxillary sinuses and of multiple ethmoid air cells. Orbits are unremarkable. ____________________ IMPRESSION:  1.  No findings suspicious for tumor progression. Indeterminate 6 mm focus of nodular enhancement at the posterior margin of the left frontal resection bed. Attention on follow-up exams is advised. No findings suspicious for new sites of metastatic disease.  --- End of Report ---

## 2025-02-02 NOTE — ASSESSMENT
[FreeTextEntry1] : Pt is s/p 10/1/24 Left frontal craniotomy resection of metastatic NSCLC Path: adenocarcinoma compatible with adenocarcinoma of lung origin.  Following with Dr. Robb for NSLC stage 4 currently in chemo treatment at 450.  Ambulating with cane, RLE 4+/5 hip flexor weakness, otherwise full strength.  MRI on 1/21/25 shows significant decrease in size of left frontal tumor cavity with mild posterior enhancement of uncertain nature.  Pt is doing well, but tired due to the chemo.  Plan:  RTO 3 months following next MRI ordered by Dr. Robb (3/2025)

## 2025-02-02 NOTE — PHYSICAL EXAM
[General Appearance - Alert] : alert [General Appearance - In No Acute Distress] : in no acute distress [Oriented To Time, Place, And Person] : oriented to person, place, and time [Impaired Insight] : insight and judgment were intact [Sclera] : the sclera and conjunctiva were normal [Hearing Threshold Finger Rub Not [place]] : hearing was normal [Neck Appearance] : the appearance of the neck was normal [] : no respiratory distress [Involuntary Movements] : no involuntary movements were seen [Skin Color & Pigmentation] : normal skin color and pigmentation [Motor Tone] : muscle tone was normal in all four extremities [Motor Strength] : muscle strength was normal in all four extremities [FreeTextEntry6] : MUNGUIA with no drift, good . [FreeTextEntry8] : amb with mildy unsteady gait, RLE proximal weakness 4/5 otherwise full strength  [FreeTextEntry1] : amb with mildy unsteady gait, RLE proximal weakness 4/5 otherwise full strength

## 2025-02-02 NOTE — REVIEW OF SYSTEMS
[Feeling Tired] : feeling tired [As Noted in HPI] : as noted in HPI [Leg Weakness] : leg weakness [Negative] : Heme/Lymph [FreeTextEntry2] : in chemo treatment [de-identified] : RLE proximal 4/5

## 2025-02-25 NOTE — ASSESSMENT
[FreeTextEntry1] : 62M former smoker with Stage IV/metastatic NSCLC with adenocarcinoma histology with a large ABAD primary tumor with evidence of metastatic disease involving the intrathoracic lymph nodes, cervical lymph nodes and brain.    Patient is status post craniotomy resection of a large left frontal brain tumor metastasis October 2024.  Tumor tested PD-L1 high-positive and Foundation negative for actionable mutations (+ BRAF G466A, NF1, TP53, among others) and TMB-High. Received post-op GK-SRS in late-October 2024. He started first-line systemic therapy with combination of chemotherapy and immunotherapy with carboplatin, pemetrexed, and pembrolizumab on 11/8/24; achieved nice [place].  Restaging PET/CT 12/23/24 with nice deep response to interval therapy.  Treated with 4 cycles of triplet therapy through early January 2025 followed by pemetrexed/pembrolizumab maintenance as of late January 2025. Recommend: - Continue with maintenance pemetrexed/pembrolizumab for as long as it benefits the patient.  For C6 today. - Continue dexamethasone in the stacey-chemo setting the day before and the day after chemo. Continue folic acid 1mg by mouth daily and vitamin B12 injection every 9 weeks.  - Repeat labs today.  Monitor periodic TFT's while on immunotherapy - Continue follow up with Dr. Reyes and Dr. Robb, s/p craniotomy resection of large frontal tumor 10/1/24 and s/p GK-SRS completed 10/31/24. Patient stopped the Keppra already. For 3 month Brain MRI in April  - Normal screening PSA which was checked given the PET CT scan findings involving the prostate.  - There was an apparent lytic lesion of the left iliac bone noted on inpatient CT scan, however, this was not noted to be hypermetabolic on his PET CT scan.  Would avoid the addition of a bone modifying agent at this juncture - PET CT scan noted non-FDG avid thickening of the upper esophagus and nonspecific avidity of the stomach which were felt to be indeterminant. GI evaluation encouraged and EGD is planned.  -Patient is not interested in psycho-onc referral at this time - Follow-up prior to next cycle with restaging scan obtained beforehand or sooner should problems arise  Checkout form provided to schedule appt through May  Rick Calvert  PGY- 6  Heme/onc fellow

## 2025-02-25 NOTE — PHYSICAL EXAM
[Restricted in physically strenuous activity but ambulatory and able to carry out work of a light or sedentary nature] : Status 1- Restricted in physically strenuous activity but ambulatory and able to carry out work of a light or sedentary nature, e.g., light house work, office work [Normal] : affect appropriate [de-identified] : No icterus  [de-identified] : MMM O/P Clear [de-identified] : Supple No LAD [de-identified] : Clear [de-identified] : S1 S2 [de-identified] : No edema  [de-identified] : Soft NT/ND No masses [de-identified] : No spine/CVA tenderness  [de-identified] : Ambulatory.

## 2025-02-25 NOTE — HISTORY OF PRESENT ILLNESS
[Disease: _____________________] : Disease: [unfilled] [AJCC Stage: ____] : AJCC Stage: [unfilled] [de-identified] : Mr. Tomlin is a 62-year-old man, non-smoker, with PMH of HTN and DM2 who presented to [facility] 9/26/24 with decreased intake and altered mental status x 1 week. CT head followed by brain MRI showed a 6.5 x 5.1 x 4.8 cm peripherally enhancing, centrally necrotic mass in the left anterior frontal lobe, with rightward midline shift. 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 On 10/1/24 he underwent left craniotomy for tumor resection. Pathology of the excised left frontal brain tumor was interpreted as showing non-small cell carcinoma compatible with adenocarcinoma of lung origin. PDL1 TPS >95%.  Foundation negative for actionable mutations (+ BRAF G466A, NF1, TP53, among others) and TMB-High. Treated with GK-SRS to brain in late October 2024.  Started first line combination chemotherapy and immunotherapy with Carboplatin/Pemetrexed and Pembrolizumab 11/8/24.  Achieved [place].  Treated with 4 cycles of triplet therapy through early January 2025 followed by pemetrexed/pembrolizumab maintenance as of late January 2025.  [de-identified] : - Left frontal brain tumor excision: 10/1/2024: Metastatic non-small cell carcinoma compatible with adenocarcinoma of lung origin. PD-L1 high-positive > 95%.  Foundation:  + BRAF G466A, NF1, TP53, among others.  TMB-High.  [de-identified] : Mr. Tomlin had left craniotomy and tumor resection 10/1/24 and received post-op GK-SRS from 10/29 - 10/31/24.  He started first line systemic therapy with combination chemotherapy and immunotherapy, with carboplatin, pemetrexed, and pembrolizumab on 11/8/24; achieved [place].  Treated with 4 cycles of triplet therapy through early January 2025 followed by pemetrexed/pembrolizumab maintenance as of late January 2025. Patient presents today with his wife- C6 of systemic therapy today He still has some intermittent swelling of his left face that has lingered since craniotomy, likely post-surgical in nature.   He reports ongoing fatigue. He's eating well and gaining some weight. Denies F/C/N/V/D. He completed course of OT.  Started PT. Wife reports that patient is stressed; and he reports that he got evaluated by neuropysch. Patient considering new PCP; provided phone number for [facility]. He is planning to get EGD for the evaluation on esophageal thickening. Brain MRI from 1/21/25 :  No findings suspicious for tumor progression.

## 2025-02-25 NOTE — HISTORY OF PRESENT ILLNESS
[Disease: _____________________] : Disease: [unfilled] [AJCC Stage: ____] : AJCC Stage: [unfilled] [de-identified] : Mr. Tomlin is a 62-year-old man, non-smoker, with PMH of HTN and DM2 who presented to [facility] 9/26/24 with decreased intake and altered mental status x 1 week. CT head followed by brain MRI showed a 6.5 x 5.1 x 4.8 cm peripherally enhancing, centrally necrotic mass in the left anterior frontal lobe, with rightward midline shift. 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 On 10/1/24 he underwent left craniotomy for tumor resection. Pathology of the excised left frontal brain tumor was interpreted as showing non-small cell carcinoma compatible with adenocarcinoma of lung origin. PDL1 TPS >95%.  Foundation negative for actionable mutations (+ BRAF G466A, NF1, TP53, among others) and TMB-High. Treated with GK-SRS to brain in late October 2024.  Started first line combination chemotherapy and immunotherapy with Carboplatin/Pemetrexed and Pembrolizumab 11/8/24.  Achieved [place].  Treated with 4 cycles of triplet therapy through early January 2025 followed by pemetrexed/pembrolizumab maintenance as of late January 2025.  [de-identified] : - Left frontal brain tumor excision: 10/1/2024: Metastatic non-small cell carcinoma compatible with adenocarcinoma of lung origin. PD-L1 high-positive > 95%.  Foundation:  + BRAF G466A, NF1, TP53, among others.  TMB-High.  [de-identified] : Mr. Tomlin had left craniotomy and tumor resection 10/1/24 and received post-op GK-SRS from 10/29 - 10/31/24.  He started first line systemic therapy with combination chemotherapy and immunotherapy, with carboplatin, pemetrexed, and pembrolizumab on 11/8/24; achieved [place].  Treated with 4 cycles of triplet therapy through early January 2025 followed by pemetrexed/pembrolizumab maintenance as of late January 2025. Patient presents today with his wife- C6 of systemic therapy today He still has some intermittent swelling of his left face that has lingered since craniotomy, likely post-surgical in nature.   He reports ongoing fatigue. He's eating well and gaining some weight. Denies F/C/N/V/D. He completed course of OT.  Started PT. Wife reports that patient is stressed; and he reports that he got evaluated by neuropysch. Patient considering new PCP; provided phone number for [facility]. He is planning to get EGD for the evaluation on esophageal thickening. Brain MRI from 1/21/25 :  No findings suspicious for tumor progression.

## 2025-02-25 NOTE — RESULTS/DATA
[FreeTextEntry1] : - CT C/A/P with contrast 9/26/24: Large left lung mass concerning for primary lung neoplasm. 1.3 cm gastrohepatic lymph node, and 1.4cm lytic lesion in the left iliac bone concerning for distal metastasis.   - CT Head without contrast: 9/26/24: 6.1 x 5 x 3.8 cm mostly hypoattenuating mass lesion in the left anterior frontal lobe, crossing the anterior falx into the medial right temporal lobe with surrounding vasogenic edema. There is severe mass effect on the surrounding parenchyma and on the ventricular system with marked left-to-right midline shift of 1.6cm. Recommend contrast-enhanced MRI brain for further characterization.   - MRI brain 9/30/24: 6.5 x 5.1 x 4.8 cm peripherally enhancing, centrally necrotic mass within the left anterior frontal lobe demonstrating regions of restricted diffusion. Findings are suspicious for the presence of glioblastoma multiform. Differential diagnosis includes metastatic disease.  Right midline shift of 1cm. Rightward subfalcine herniation of 1.6cm. Basal cisterns are visualized. Mild asymmetric dilation of the right lateral ventricle.   - Surgical Pathology 10/1/24: 1. Left frontal tumor on dura, biopsy: Metastatic non-small cell carcinoma, compatible with adenocarcinoma of lung origin. 2. Left frontal tumor on dura, biopsy: Metastatic non-small cell carcinoma, compatible with adenocarcinoma of lung origin. 3. Left frontal brain tumor on dura, excision: Metastatic non-small cell carcinoma, compatible with adenocarcinoma of lung origin.  Note: Sections show a high-grade malignant neoplasm with predominantly solid growth and scattered foci of necrosis.  IHC: neoplastic cells are diffusely positive for high-molecular weight (HMW), cytokeratin, CK7, TTF-1, and p63, and are negative for CK20, GATA3, p40, NKX3.1, and PAAX8. GFAP highlights background brain parenchyma.  PDL1 TPS >95%.   - Cytology 10/1/24: Brain, left, frontal, fluid: Positive for malignant cells. Carcinoma.   - CT head without contrast 10/2/24: Postoperative changes compatible with left frontal craniotomy. Some low attenuation identified in the postop region with some associated air as well as subtle high attenuation. this area of high changes compatible areas of hemorrhage on postop material. Surrounding edema is again seen. Decreased mass effect on the anteior aspect of both lateral ventricle is seen. Residual right left shift is seen (1.1cm).   -MR Brain 10/2/24: Postsurgical changes in the left frontal region status post left frontal craniotomy and mass resection.  Left frontal vasogenic edema with mass effect on the left lateral ventricle and approximately 1cm of rightward midline shift similar to presurgical exam.  Focal plaque like region of enhancement in the posterior aspect of the resection cavity possibly representing residual enhancing tumor versus postoperative changes. Recommend continued follow-up.   - PET/CT 10/16/24:  1. Large peripherally FDG-avid, centrally photopenic multiloculated mass in the left perihilar/paramediastinal left upper lobe corresponds to known malignancy. Separate FDG-avid left upper lobe nodule is compatible with metastasis.  2. FDG-avid left perihilar, bilateral mediastinal, bilateral cervical, and distal right external iliac lymph nodes. The left perihilar and AP window lymph nodes are compatible with metastatic disease. Small FDG-avid bilateral mediastinal/cervical and right external iliac lymph nodes are indeterminate. Differential diagnosis includes inflammatory and/or neoplastic etiologies. Cervical/supraclavicular lymph nodes are accessible to an ultrasound-guided percutaneous needle biopsy.  3. Enlarged prostate gland with right greater than left hypermetabolism. Differential diagnosis includes infectious/inflammatory etiologies and prostate carcinoma. Urology consultation is recommended.  4. Nonspecific FDG-avid thickening of upper esophagus and nonspecific hypermetabolism of fundus and body of stomach, most intense along the lesser curvature, are indeterminate. Endoscopy is recommended to exclude neoplasm.  5. Mild, diffuse hypermetabolism in nonenlarged thyroid gland suggests thyroiditis. Please correlate clinically.  -PET/CT 12/23/24:   1. Compared with PET/CT 10/16/2024, there has been interval response to therapy with residual FDG avid disease evident. 2. Resolution of FDG avid cervical lymph nodes. 3. Decrease in number of FDG avid lymph nodes in the mediastinum and hilum. Small lymph nodes in the AP window remain evident, with mild residual FDG uptake. 4. Decrease in size of a left perihilar mass and left upper lobe mass, with decrease in extent of FDG avidity and with focal areas of increasing FDG avidity when compared to prior exam. 5. Similar size and mildly decreased activity in an apical left upper lobe nodule. 6. Decrease in size and FDG uptake in the prostate, possibly due to resolving infection. Decrease in FDG avidity of a right external iliac lymph node, which may have been reactive. Recommend urology consultation if not previously performed. 7. Resolution of FDG avidity in a thickened upper esophagus. Increase in nonspecific hypermetabolism in the stomach. Recommend correlation with endoscopy. 8. Similar mild diffuse hypermetabolism in the thyroid. This can be correlated with TSH.  Images Reviewed/Interpreted:  -MRI Brain 1/21/25:  No findings suspicious for tumor progression. Indeterminate 6 mm focus of nodular enhancement at the posterior margin of the left frontal resection bed. Attention on follow-up exams is advised. No findings suspicious for new sites of metastatic disease.

## 2025-02-25 NOTE — PHYSICAL EXAM
[Restricted in physically strenuous activity but ambulatory and able to carry out work of a light or sedentary nature] : Status 1- Restricted in physically strenuous activity but ambulatory and able to carry out work of a light or sedentary nature, e.g., light house work, office work [Normal] : affect appropriate [de-identified] : No icterus  [de-identified] : MMM O/P Clear [de-identified] : Supple No LAD [de-identified] : Clear [de-identified] : S1 S2 [de-identified] : No edema  [de-identified] : Soft NT/ND No masses [de-identified] : No spine/CVA tenderness  [de-identified] : Ambulatory.

## 2025-02-25 NOTE — ASSESSMENT
[FreeTextEntry1] : 62M former smoker with Stage IV/metastatic NSCLC with adenocarcinoma histology with a large ABAD primary tumor with evidence of metastatic disease involving the intrathoracic lymph nodes, cervical lymph nodes and brain.    Patient is status post craniotomy resection of a large left frontal brain tumor metastasis October 2024.  Tumor tested PD-L1 high-positive and Foundation negative for actionable mutations (+ BRAF G466A, NF1, TP53, among others) and TMB-High. Received post-op GK-SRS in late-October 2024. He started first-line systemic therapy with combination of chemotherapy and immunotherapy with carboplatin, pemetrexed, and pembrolizumab on 11/8/24; achieved nice [place].  Restaging PET/CT 12/23/24 with nice deep response to interval therapy.  Treated with 4 cycles of triplet therapy through early January 2025 followed by pemetrexed/pembrolizumab maintenance as of late January 2025. Recommend: - Continue with maintenance pemetrexed/pembrolizumab for as long as it benefits the patient.  For C6 today. - Continue dexamethasone in the stacey-chemo setting the day before and the day after chemo. Continue folic acid 1mg by mouth daily and vitamin B12 injection every 9 weeks.  - Repeat labs today.  Monitor periodic TFT's while on immunotherapy - Continue follow up with Dr. Reyes and Dr. Robb, s/p craniotomy resection of large frontal tumor 10/1/24 and s/p GK-SRS completed 10/31/24. Patient stopped the Keppra already. For 3 month Brain MRI in April  - Normal screening PSA which was checked given the PET CT scan findings involving the prostate.  - There was an apparent lytic lesion of the left iliac bone noted on inpatient CT scan, however, this was not noted to be hypermetabolic on his PET CT scan.  Would avoid the addition of a bone modifying agent at this juncture - PET CT scan noted non-FDG avid thickening of the upper esophagus and nonspecific avidity of the stomach which were felt to be indeterminant. GI evaluation encouraged and EGD is planned.  -Patient is not interested in psycho-onc referral at this time - Follow-up prior to next cycle with restaging scan obtained beforehand or sooner should problems arise  Checkout form provided to schedule appt through May  Rick Calvert  PGY- 6  Heme/onc fellow

## 2025-03-18 NOTE — HISTORY OF PRESENT ILLNESS
[Disease: _____________________] : Disease: [unfilled] [AJCC Stage: ____] : AJCC Stage: [unfilled] [de-identified] : Mr. Tomlin is a 62-year-old man, non-smoker, with PMH of HTN and DM2 who presented to [facility] 9/26/24 with decreased intake and altered mental status x 1 week. CT head followed by brain MRI showed a 6.5 x 5.1 x 4.8 cm peripherally enhancing, centrally necrotic mass in the left anterior frontal lobe, with rightward midline shift. 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 On 10/1/24 he underwent left craniotomy for tumor resection. Pathology of the excised left frontal brain tumor was interpreted as showing non-small cell carcinoma compatible with adenocarcinoma of lung origin. PDL1 TPS >95%.  Foundation negative for actionable mutations (+ BRAF G466A, NF1, TP53, among others) and TMB-High. Treated with GK-SRS to brain in late October 2024.  Started first line combination chemotherapy and immunotherapy with Carboplatin/Pemetrexed and Pembrolizumab 11/8/24.  Achieved [place].  Treated with 4 cycles of triplet therapy through early January 2025 followed by pemetrexed/pembrolizumab maintenance as of late January 2025.  Treated with maintenance therapy through February 2025.  Developed disease progression in March 2025 and started second line docetaxel/ramucirumab.  [de-identified] : - Left frontal brain tumor excision: 10/1/2024: Metastatic non-small cell carcinoma compatible with adenocarcinoma of lung origin. PD-L1 high-positive > 95%.  Foundation:  + BRAF G466A, NF1, TP53, among others.  TMB-High.  [de-identified] : Mr. Tomlin had left craniotomy and tumor resection 10/1/24 and received post-op GK-SRS from 10/29 - 10/31/24.  He started first line systemic therapy with combination chemotherapy and immunotherapy, with carboplatin, pemetrexed, and pembrolizumab on 11/8/24; achieved [place].  Treated with 4 cycles of triplet therapy through early January 2025 followed by pemetrexed/pembrolizumab maintenance as of late January 2025. Patient presented with his wife prior to planned treatment with C7 of maintenance pemetrexed/pembrolizumab; he received C6 of maintenance therapy in 2/21. Patient reports an increase in cough which is improved with drinking water.  Patient reports the patient is wheezing more; patient himself does not notice this.  He has ongoing fatigue but appetite is intact and weight is stable.  Denies F/C/N/V/D.  Restaging PET/CT 3/9/2025 with disease progression.

## 2025-03-18 NOTE — ASSESSMENT
[FreeTextEntry1] : 62M former smoker with Stage IV/metastatic NSCLC with adenocarcinoma histology with a large ABAD primary tumor with evidence of metastatic disease involving the intrathoracic lymph nodes, cervical lymph nodes and brain.    Patient is status post craniotomy resection of a large left frontal brain tumor metastasis October 2024.  Tumor tested PD-L1 high-positive and Foundation negative for actionable mutations (+ BRAF G466A, NF1, TP53, among others) and TMB-High. Received post-op GK-SRS in late-October 2024. He started first-line systemic therapy with combination of chemotherapy and immunotherapy with carboplatin, pemetrexed, and pembrolizumab on 11/8/24; achieved nice CT. Treated with 4 cycles of triplet therapy through early January 2025 followed by pemetrexed/pembrolizumab maintenance as of late January 2025.  Treated with maintenance therapy through February 2025.  Developed disease progression in March 2025.   Restaging PET/CT 3/9/2025 with disease progression. Recommend: - Discontinue pemetrexed/pembrolizumab. -Recommend treatment with 2nd line Docetaxel 60mg/m2 and Ramucirumab 7.5mg/kg IV q3 weeks.  Potential treatment with nab-paclitaxel discussed should that become necessary.   Risks, benefits and side effects d/w patient who agreed to proceed and signed the consent form; drug info sheets provided.   - Continue dexamethasone in the stacey-chemo setting the day before and the day after chemo. Can finish current bottle of folic acid and then D/C.  -Precribed albuterol inhaler to utilize given wheezing.   -Will review case at thoracic tumor board to discuss possible need for bronch to evaluate the intrathoracic findings, particularly the ABAD mass causing atelectasis.  Possible referral to IP needed.  - Repeat labs today.   - Continue follow up with Dr. Reyes and Dr. Robb, s/p craniotomy resection of large frontal tumor 10/1/24 and s/p GK-SRS completed 10/31/24. Patient stopped the Keppra already. For 3 month Brain MRI in April  - There was an apparent lytic lesion of the left iliac bone noted on inpatient CT scan, however, this was not noted to be hypermetabolic on his initial PET CT scan.  Would avoid the addition of a bone modifying agent at this juncture - PET CT scan noted non-FDG avid thickening of the upper esophagus and nonspecific avidity of the stomach which were felt to be indeterminant. Previously recommended GI evaluation for EGD.  Patient was going to pursue this.  Discussed he can likely delay this for now given that there was improvement noted on March PET/CT. Can pursue this down the line.  -Patient declined psycho-onc referral at this time - Follow-up following each treatment mid-cycle.    Plan to obtain a restaging scan following 3 cycles to assess response.

## 2025-03-18 NOTE — HISTORY OF PRESENT ILLNESS
[Disease: _____________________] : Disease: [unfilled] [AJCC Stage: ____] : AJCC Stage: [unfilled] [de-identified] : Mr. Tomlin is a 62-year-old man, non-smoker, with PMH of HTN and DM2 who presented to [facility] 9/26/24 with decreased intake and altered mental status x 1 week. CT head followed by brain MRI showed a 6.5 x 5.1 x 4.8 cm peripherally enhancing, centrally necrotic mass in the left anterior frontal lobe, with rightward midline shift. 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 On 10/1/24 he underwent left craniotomy for tumor resection. Pathology of the excised left frontal brain tumor was interpreted as showing non-small cell carcinoma compatible with adenocarcinoma of lung origin. PDL1 TPS >95%.  Foundation negative for actionable mutations (+ BRAF G466A, NF1, TP53, among others) and TMB-High. Treated with GK-SRS to brain in late October 2024.  Started first line combination chemotherapy and immunotherapy with Carboplatin/Pemetrexed and Pembrolizumab 11/8/24.  Achieved [place].  Treated with 4 cycles of triplet therapy through early January 2025 followed by pemetrexed/pembrolizumab maintenance as of late January 2025.  Treated with maintenance therapy through February 2025.  Developed disease progression in March 2025 and started second line docetaxel/ramucirumab.  [de-identified] : - Left frontal brain tumor excision: 10/1/2024: Metastatic non-small cell carcinoma compatible with adenocarcinoma of lung origin. PD-L1 high-positive > 95%.  Foundation:  + BRAF G466A, NF1, TP53, among others.  TMB-High.  [de-identified] : Mr. Tomlin had left craniotomy and tumor resection 10/1/24 and received post-op GK-SRS from 10/29 - 10/31/24.  He started first line systemic therapy with combination chemotherapy and immunotherapy, with carboplatin, pemetrexed, and pembrolizumab on 11/8/24; achieved [place].  Treated with 4 cycles of triplet therapy through early January 2025 followed by pemetrexed/pembrolizumab maintenance as of late January 2025. Patient presented with his wife prior to planned treatment with C7 of maintenance pemetrexed/pembrolizumab; he received C6 of maintenance therapy in 2/21. Patient reports an increase in cough which is improved with drinking water.  Patient reports the patient is wheezing more; patient himself does not notice this.  He has ongoing fatigue but appetite is intact and weight is stable.  Denies F/C/N/V/D.  Restaging PET/CT 3/9/2025 with disease progression.

## 2025-03-18 NOTE — ASSESSMENT
[FreeTextEntry1] : 62M former smoker with Stage IV/metastatic NSCLC with adenocarcinoma histology with a large ABAD primary tumor with evidence of metastatic disease involving the intrathoracic lymph nodes, cervical lymph nodes and brain.    Patient is status post craniotomy resection of a large left frontal brain tumor metastasis October 2024.  Tumor tested PD-L1 high-positive and Foundation negative for actionable mutations (+ BRAF G466A, NF1, TP53, among others) and TMB-High. Received post-op GK-SRS in late-October 2024. He started first-line systemic therapy with combination of chemotherapy and immunotherapy with carboplatin, pemetrexed, and pembrolizumab on 11/8/24; achieved nice [place]. Treated with 4 cycles of triplet therapy through early January 2025 followed by pemetrexed/pembrolizumab maintenance as of late January 2025.  Treated with maintenance therapy through February 2025.  Developed disease progression in March 2025.   Restaging PET/CT 3/9/2025 with disease progression. Recommend: - Discontinue pemetrexed/pembrolizumab. -Recommend treatment with 2nd line Docetaxel 60mg/m2 and Ramucirumab 7.5mg/kg IV q3 weeks.  Potential treatment with nab-paclitaxel discussed should that become necessary.   Risks, benefits and side effects d/w patient who agreed to proceed and signed the consent form; drug info sheets provided.   - Continue dexamethasone in the stacey-chemo setting the day before and the day after chemo. Can finish current bottle of folic acid and then D/C.  -Precribed albuterol inhaler to utilize given wheezing.   -Will review case at thoracic tumor board to discuss possible need for bronch to evaluate the intrathoracic findings, particularly the ABAD mass causing atelectasis.  Possible referral to IP needed.  - Repeat labs today.   - Continue follow up with Dr. Reyes and Dr. Robb, s/p craniotomy resection of large frontal tumor 10/1/24 and s/p GK-SRS completed 10/31/24. Patient stopped the Keppra already. For 3 month Brain MRI in April  - There was an apparent lytic lesion of the left iliac bone noted on inpatient CT scan, however, this was not noted to be hypermetabolic on his initial PET CT scan.  Would avoid the addition of a bone modifying agent at this juncture - PET CT scan noted non-FDG avid thickening of the upper esophagus and nonspecific avidity of the stomach which were felt to be indeterminant. Previously recommended GI evaluation for EGD.  Patient was going to pursue this.  Discussed he can likely delay this for now given that there was improvement noted on March PET/CT. Can pursue this down the line.  -Patient declined psycho-onc referral at this time - Follow-up following each treatment mid-cycle.    Plan to obtain a restaging scan following 3 cycles to assess response.

## 2025-03-18 NOTE — PHYSICAL EXAM
[Restricted in physically strenuous activity but ambulatory and able to carry out work of a light or sedentary nature] : Status 1- Restricted in physically strenuous activity but ambulatory and able to carry out work of a light or sedentary nature, e.g., light house work, office work [Normal] : affect appropriate [de-identified] : No icterus  [de-identified] : MMM O/P Clear [de-identified] : Supple No LAD [de-identified] : Scattered rhonchi/wheezes  [de-identified] : S1 S2 [de-identified] : No edema  [de-identified] : Soft NT/ND No masses [de-identified] : No spine/CVA tenderness  [de-identified] : Ambulatory.

## 2025-03-18 NOTE — RESULTS/DATA
[FreeTextEntry1] : - CT C/A/P with contrast 9/26/24: Large left lung mass concerning for primary lung neoplasm. 1.3 cm gastrohepatic lymph node, and 1.4cm lytic lesion in the left iliac bone concerning for distal metastasis.   - CT Head without contrast: 9/26/24: 6.1 x 5 x 3.8 cm mostly hypoattenuating mass lesion in the left anterior frontal lobe, crossing the anterior falx into the medial right temporal lobe with surrounding vasogenic edema. There is severe mass effect on the surrounding parenchyma and on the ventricular system with marked left-to-right midline shift of 1.6cm. Recommend contrast-enhanced MRI brain for further characterization.   - MRI brain 9/30/24: 6.5 x 5.1 x 4.8 cm peripherally enhancing, centrally necrotic mass within the left anterior frontal lobe demonstrating regions of restricted diffusion. Findings are suspicious for the presence of glioblastoma multiform. Differential diagnosis includes metastatic disease.  Right midline shift of 1cm. Rightward subfalcine herniation of 1.6cm. Basal cisterns are visualized. Mild asymmetric dilation of the right lateral ventricle.   - Surgical Pathology 10/1/24: 1. Left frontal tumor on dura, biopsy: Metastatic non-small cell carcinoma, compatible with adenocarcinoma of lung origin. 2. Left frontal tumor on dura, biopsy: Metastatic non-small cell carcinoma, compatible with adenocarcinoma of lung origin. 3. Left frontal brain tumor on dura, excision: Metastatic non-small cell carcinoma, compatible with adenocarcinoma of lung origin.  Note: Sections show a high-grade malignant neoplasm with predominantly solid growth and scattered foci of necrosis.  IHC: neoplastic cells are diffusely positive for high-molecular weight (HMW), cytokeratin, CK7, TTF-1, and p63, and are negative for CK20, GATA3, p40, NKX3.1, and PAAX8. GFAP highlights background brain parenchyma.  PDL1 TPS >95%.   - Cytology 10/1/24: Brain, left, frontal, fluid: Positive for malignant cells. Carcinoma.   - CT head without contrast 10/2/24: Postoperative changes compatible with left frontal craniotomy. Some low attenuation identified in the postop region with some associated air as well as subtle high attenuation. this area of high changes compatible areas of hemorrhage on postop material. Surrounding edema is again seen. Decreased mass effect on the anteior aspect of both lateral ventricle is seen. Residual right left shift is seen (1.1cm).   -MR Brain 10/2/24: Postsurgical changes in the left frontal region status post left frontal craniotomy and mass resection.  Left frontal vasogenic edema with mass effect on the left lateral ventricle and approximately 1cm of rightward midline shift similar to presurgical exam.  Focal plaque like region of enhancement in the posterior aspect of the resection cavity possibly representing residual enhancing tumor versus postoperative changes. Recommend continued follow-up.   - PET/CT 10/16/24:  1. Large peripherally FDG-avid, centrally photopenic multiloculated mass in the left perihilar/paramediastinal left upper lobe corresponds to known malignancy. Separate FDG-avid left upper lobe nodule is compatible with metastasis.  2. FDG-avid left perihilar, bilateral mediastinal, bilateral cervical, and distal right external iliac lymph nodes. The left perihilar and AP window lymph nodes are compatible with metastatic disease. Small FDG-avid bilateral mediastinal/cervical and right external iliac lymph nodes are indeterminate. Differential diagnosis includes inflammatory and/or neoplastic etiologies. Cervical/supraclavicular lymph nodes are accessible to an ultrasound-guided percutaneous needle biopsy.  3. Enlarged prostate gland with right greater than left hypermetabolism. Differential diagnosis includes infectious/inflammatory etiologies and prostate carcinoma. Urology consultation is recommended.  4. Nonspecific FDG-avid thickening of upper esophagus and nonspecific hypermetabolism of fundus and body of stomach, most intense along the lesser curvature, are indeterminate. Endoscopy is recommended to exclude neoplasm.  5. Mild, diffuse hypermetabolism in nonenlarged thyroid gland suggests thyroiditis. Please correlate clinically.  -PET/CT 12/23/24:   1. Compared with PET/CT 10/16/2024, there has been interval response to therapy with residual FDG avid disease evident. 2. Resolution of FDG avid cervical lymph nodes. 3. Decrease in number of FDG avid lymph nodes in the mediastinum and hilum. Small lymph nodes in the AP window remain evident, with mild residual FDG uptake. 4. Decrease in size of a left perihilar mass and left upper lobe mass, with decrease in extent of FDG avidity and with focal areas of increasing FDG avidity when compared to prior exam. 5. Similar size and mildly decreased activity in an apical left upper lobe nodule. 6. Decrease in size and FDG uptake in the prostate, possibly due to resolving infection. Decrease in FDG avidity of a right external iliac lymph node, which may have been reactive. Recommend urology consultation if not previously performed. 7. Resolution of FDG avidity in a thickened upper esophagus. Increase in nonspecific hypermetabolism in the stomach. Recommend correlation with endoscopy. 8. Similar mild diffuse hypermetabolism in the thyroid. This can be correlated with TSH.  -MRI Brain 1/21/25:  No findings suspicious for tumor progression. Indeterminate 6 mm focus of nodular enhancement at the posterior margin of the left frontal resection bed. Attention on follow-up exams is advised. No findings suspicious for new sites of metastatic disease.  Images Reviewed/Interpreted:  -PET/CT 3/9/25:  Compared to FDG-PET/CT scan dated 12/23/2024:  1. FDG-avid partially necrotic left upper lobe mass and ipsilateral mediastinal/hilar lymphadenopathy, increased as compared to prior study, with associated increased left upper lobe atelectasis. Findings are compatible with progression of disease. Previously noted FDG-avid left apical nodule is not delineated within the atelectatic left upper lobe/lymphadenopathy.  2. Nonspecific gastric hypermetabolism is decreased.  3. Nonspecific, mild FDG avidity and nonenlarged prostate gland, similar in metabolism and less extensive.

## 2025-03-18 NOTE — PHYSICAL EXAM
[Restricted in physically strenuous activity but ambulatory and able to carry out work of a light or sedentary nature] : Status 1- Restricted in physically strenuous activity but ambulatory and able to carry out work of a light or sedentary nature, e.g., light house work, office work [Normal] : affect appropriate [de-identified] : No icterus  [de-identified] : MMM O/P Clear [de-identified] : Supple No LAD [de-identified] : Scattered rhonchi/wheezes  [de-identified] : S1 S2 [de-identified] : No edema  [de-identified] : Soft NT/ND No masses [de-identified] : No spine/CVA tenderness  [de-identified] : Ambulatory.

## 2025-03-24 NOTE — HISTORY OF PRESENT ILLNESS
[Disease: _____________________] : Disease: [unfilled] [AJCC Stage: ____] : AJCC Stage: [unfilled] [de-identified] : Mr. Tomlin is a 63-year-old man, non-smoker, with PMH of HTN and DM2 who presented to [facility] 9/26/24 with decreased intake and altered mental status x 1 week. CT head followed by brain MRI showed a 6.5 x 5.1 x 4.8 cm peripherally enhancing, centrally necrotic mass in the left anterior frontal lobe, with rightward midline shift. 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 On 10/1/24 he underwent left craniotomy for tumor resection. Pathology of the excised left frontal brain tumor was interpreted as showing non-small cell carcinoma compatible with adenocarcinoma of lung origin. PDL1 TPS >95%.  Foundation negative for actionable mutations (+ BRAF G466A, NF1, TP53, among others) and TMB-High. Treated with GK-SRS to brain in late October 2024.  Started first line combination chemotherapy and immunotherapy with Carboplatin/Pemetrexed and Pembrolizumab 11/8/24.  Achieved FL.  Treated with 4 cycles of triplet therapy through early January 2025 followed by pemetrexed/pembrolizumab maintenance as of late January 2025.  Treated with maintenance therapy through February 2025.  Developed disease progression in March 2025 and started second line docetaxel/ramucirumab on 3/14/25. Case discussed at interdisciplinary tumor board with recommendation for bronchoscopy with IP due to tumor narrowing of the airway, and possible palliative thoracic RT afterwards.   [de-identified] : - Left frontal brain tumor excision: 10/1/2024: Metastatic non-small cell carcinoma compatible with adenocarcinoma of lung origin. PD-L1 high-positive > 95%.  Foundation:  + BRAF G466A, NF1, TP53, among others.  TMB-High.  [de-identified] : Mr. Tomlin is on second line systemic therapy with docetaxel and ramucirumab started 3/14/25, for metastatic non-small cell lung adenocarcinoma with resected brain metastasis s/p GK-SRS in late Oct 2024.  He is here for mid-cycle evaluation after receiving cycle 1 docetaxel and ramucirumab on 3/14/25.  Since the last visit, his case was discussed at interdisciplinary tumor board with recommendations for IP bronchoscopy to avoid further narrowing of the airway, with potential palliative thoracic RT afterwards. He is seeing Dr. Portillo on 4/3.  Reports feeling fatigue and mild nausea after treatment. Noted edema to left ankle which improved with leg elevation.  Otherwise denies adverse effects. Denies fever, chills, cough, dyspnea, vomiting, bleeding, elevated blood pressures, urinary issues, constipation, or diarrhea.

## 2025-03-24 NOTE — HISTORY OF PRESENT ILLNESS
[Disease: _____________________] : Disease: [unfilled] [AJCC Stage: ____] : AJCC Stage: [unfilled] [de-identified] : Mr. Tomlin is a 63-year-old man, non-smoker, with PMH of HTN and DM2 who presented to [facility] 9/26/24 with decreased intake and altered mental status x 1 week. CT head followed by brain MRI showed a 6.5 x 5.1 x 4.8 cm peripherally enhancing, centrally necrotic mass in the left anterior frontal lobe, with rightward midline shift. 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 On 10/1/24 he underwent left craniotomy for tumor resection. Pathology of the excised left frontal brain tumor was interpreted as showing non-small cell carcinoma compatible with adenocarcinoma of lung origin. PDL1 TPS >95%.  Foundation negative for actionable mutations (+ BRAF G466A, NF1, TP53, among others) and TMB-High. Treated with GK-SRS to brain in late October 2024.  Started first line combination chemotherapy and immunotherapy with Carboplatin/Pemetrexed and Pembrolizumab 11/8/24.  Achieved ND.  Treated with 4 cycles of triplet therapy through early January 2025 followed by pemetrexed/pembrolizumab maintenance as of late January 2025.  Treated with maintenance therapy through February 2025.  Developed disease progression in March 2025 and started second line docetaxel/ramucirumab on 3/14/25. Case discussed at interdisciplinary tumor board with recommendation for bronchoscopy with IP due to tumor narrowing of the airway, and possible palliative thoracic RT afterwards.   [de-identified] : - Left frontal brain tumor excision: 10/1/2024: Metastatic non-small cell carcinoma compatible with adenocarcinoma of lung origin. PD-L1 high-positive > 95%.  Foundation:  + BRAF G466A, NF1, TP53, among others.  TMB-High.  [de-identified] : Mr. Tomlin is on second line systemic therapy with docetaxel and ramucirumab started 3/14/25, for metastatic non-small cell lung adenocarcinoma with resected brain metastasis s/p GK-SRS in late Oct 2024.  He is here for mid-cycle evaluation after receiving cycle 1 docetaxel and ramucirumab on 3/14/25.  Since the last visit, his case was discussed at interdisciplinary tumor board with recommendations for IP bronchoscopy to avoid further narrowing of the airway, with potential palliative thoracic RT afterwards. He is seeing Dr. Portillo on 4/3.  Reports feeling fatigue and mild nausea after treatment. Noted edema to left ankle which improved with leg elevation.  Otherwise denies adverse effects. Denies fever, chills, cough, dyspnea, vomiting, bleeding, elevated blood pressures, urinary issues, constipation, or diarrhea.

## 2025-03-24 NOTE — PHYSICAL EXAM
[Restricted in physically strenuous activity but ambulatory and able to carry out work of a light or sedentary nature] : Status 1- Restricted in physically strenuous activity but ambulatory and able to carry out work of a light or sedentary nature, e.g., light house work, office work [Normal] : affect appropriate [de-identified] : No icterus  [de-identified] : MMM O/P Clear [de-identified] : Supple No LAD [de-identified] : CTAB, good airflow ausculated [de-identified] : S1 S2 [de-identified] : Trace left ankle edema.  [de-identified] : Soft NT/ND No masses [de-identified] : No spine/CVA tenderness  [de-identified] : Ambulatory.

## 2025-03-24 NOTE — HISTORY OF PRESENT ILLNESS
[Disease: _____________________] : Disease: [unfilled] [AJCC Stage: ____] : AJCC Stage: [unfilled] [de-identified] : Mr. Tomlin is a 63-year-old man, non-smoker, with PMH of HTN and DM2 who presented to [facility] 9/26/24 with decreased intake and altered mental status x 1 week. CT head followed by brain MRI showed a 6.5 x 5.1 x 4.8 cm peripherally enhancing, centrally necrotic mass in the left anterior frontal lobe, with rightward midline shift. 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 On 10/1/24 he underwent left craniotomy for tumor resection. Pathology of the excised left frontal brain tumor was interpreted as showing non-small cell carcinoma compatible with adenocarcinoma of lung origin. PDL1 TPS >95%.  Foundation negative for actionable mutations (+ BRAF G466A, NF1, TP53, among others) and TMB-High. Treated with GK-SRS to brain in late October 2024.  Started first line combination chemotherapy and immunotherapy with Carboplatin/Pemetrexed and Pembrolizumab 11/8/24.  Achieved HI.  Treated with 4 cycles of triplet therapy through early January 2025 followed by pemetrexed/pembrolizumab maintenance as of late January 2025.  Treated with maintenance therapy through February 2025.  Developed disease progression in March 2025 and started second line docetaxel/ramucirumab on 3/14/25. Case discussed at interdisciplinary tumor board with recommendation for bronchoscopy with IP due to tumor narrowing of the airway, and possible palliative thoracic RT afterwards.   [de-identified] : - Left frontal brain tumor excision: 10/1/2024: Metastatic non-small cell carcinoma compatible with adenocarcinoma of lung origin. PD-L1 high-positive > 95%.  Foundation:  + BRAF G466A, NF1, TP53, among others.  TMB-High.  [de-identified] : Mr. Tomlin is on second line systemic therapy with docetaxel and ramucirumab started 3/14/25, for metastatic non-small cell lung adenocarcinoma with resected brain metastasis s/p GK-SRS in late Oct 2024.  He is here for mid-cycle evaluation after receiving cycle 1 docetaxel and ramucirumab on 3/14/25.  Since the last visit, his case was discussed at interdisciplinary tumor board with recommendations for IP bronchoscopy to avoid further narrowing of the airway, with potential palliative thoracic RT afterwards. He is seeing Dr. Portillo on 4/3.  Reports feeling fatigue and mild nausea after treatment. Noted edema to left ankle which improved with leg elevation.  Otherwise denies adverse effects. Denies fever, chills, cough, dyspnea, vomiting, bleeding, elevated blood pressures, urinary issues, constipation, or diarrhea.

## 2025-03-24 NOTE — ASSESSMENT
[FreeTextEntry1] : 63M former smoker with Stage IV/metastatic NSCLC with adenocarcinoma histology with a large ABAD primary tumor with evidence of metastatic disease involving the intrathoracic lymph nodes, cervical lymph nodes and brain.    Patient is status post craniotomy resection of a large left frontal brain tumor metastasis October 2024.  Tumor tested PD-L1 high-positive and Foundation negative for actionable mutations (+ BRAF G466A, NF1, TP53, among others) and TMB-High. Received post-op GK-SRS in late-October 2024. He started first-line systemic therapy with combination of chemotherapy and immunotherapy with carboplatin, pemetrexed, and pembrolizumab on 11/8/24; achieved nice DE. Treated with 4 cycles of triplet therapy through early January 2025 followed by pemetrexed/pembrolizumab maintenance as of late January 2025.  Treated with maintenance therapy through February 2025.  Developed disease progression in March 2025.  Started second line docetaxel and ramucirumab on 3/14/25. Thoracic tumor board recommendation for IP bronchoscopy and possible palliative RT for ABAD mass causing atelectasis.  Recommend: - Continue second line treatment with Docetaxel 60mg/m2 and Ramucirumab 7.5mg/kg IV every 3 weeks.  For cycle 2 on 4/4.  - Potential treatment with nab-paclitaxel discussed should that become necessary.   - Bloodwork today.  - Continue dexamethasone in the stacey-chemo setting the day before and the day after chemo.  - Using albuterol inhaler PRN wheezing.   - Continue follow up with Dr. Reyes and Dr. Robb, s/p craniotomy resection of large frontal tumor 10/1/24 and s/p GK-SRS completed 10/31/24. Patient stopped the Keppra already. For 3-month Brain MRI in April. Reminded patient to make appt for the MRI brain.  - There was an apparent lytic lesion of the left iliac bone noted on inpatient CT scan, however, this was not noted to be hypermetabolic on his initial PET CT scan.  Would avoid the addition of a bone modifying agent at this juncture - PET CT scan noted non-FDG avid thickening of the upper esophagus and nonspecific avidity of the stomach which were felt to be indeterminant. Previously recommended GI evaluation for EGD.  Patient was going to pursue this.  Discussed he can likely delay this for now given that there was improvement noted on March PET/CT. Can pursue this down the line.  - Patient declined psycho-onc referral  - Follow-up following each treatment mid-cycle.     - Requested restaging PET CT to be done following 3 cycles to assess response.

## 2025-03-24 NOTE — PHYSICAL EXAM
[Restricted in physically strenuous activity but ambulatory and able to carry out work of a light or sedentary nature] : Status 1- Restricted in physically strenuous activity but ambulatory and able to carry out work of a light or sedentary nature, e.g., light house work, office work [Normal] : affect appropriate [de-identified] : No icterus  [de-identified] : MMM O/P Clear [de-identified] : Supple No LAD [de-identified] : CTAB, good airflow ausculated [de-identified] : S1 S2 [de-identified] : Trace left ankle edema.  [de-identified] : Soft NT/ND No masses [de-identified] : No spine/CVA tenderness  [de-identified] : Ambulatory.

## 2025-03-24 NOTE — ASSESSMENT
[FreeTextEntry1] : 63M former smoker with Stage IV/metastatic NSCLC with adenocarcinoma histology with a large ABAD primary tumor with evidence of metastatic disease involving the intrathoracic lymph nodes, cervical lymph nodes and brain.    Patient is status post craniotomy resection of a large left frontal brain tumor metastasis October 2024.  Tumor tested PD-L1 high-positive and Foundation negative for actionable mutations (+ BRAF G466A, NF1, TP53, among others) and TMB-High. Received post-op GK-SRS in late-October 2024. He started first-line systemic therapy with combination of chemotherapy and immunotherapy with carboplatin, pemetrexed, and pembrolizumab on 11/8/24; achieved nice MD. Treated with 4 cycles of triplet therapy through early January 2025 followed by pemetrexed/pembrolizumab maintenance as of late January 2025.  Treated with maintenance therapy through February 2025.  Developed disease progression in March 2025.  Started second line docetaxel and ramucirumab on 3/14/25. Thoracic tumor board recommendation for IP bronchoscopy and possible palliative RT for ABAD mass causing atelectasis.  Recommend: - Continue second line treatment with Docetaxel 60mg/m2 and Ramucirumab 7.5mg/kg IV every 3 weeks.  For cycle 2 on 4/4.  - Potential treatment with nab-paclitaxel discussed should that become necessary.   - Bloodwork today.  - Continue dexamethasone in the stacey-chemo setting the day before and the day after chemo.  - Using albuterol inhaler PRN wheezing.   - Continue follow up with Dr. Reyes and Dr. Robb, s/p craniotomy resection of large frontal tumor 10/1/24 and s/p GK-SRS completed 10/31/24. Patient stopped the Keppra already. For 3-month Brain MRI in April. Reminded patient to make appt for the MRI brain.  - There was an apparent lytic lesion of the left iliac bone noted on inpatient CT scan, however, this was not noted to be hypermetabolic on his initial PET CT scan.  Would avoid the addition of a bone modifying agent at this juncture - PET CT scan noted non-FDG avid thickening of the upper esophagus and nonspecific avidity of the stomach which were felt to be indeterminant. Previously recommended GI evaluation for EGD.  Patient was going to pursue this.  Discussed he can likely delay this for now given that there was improvement noted on March PET/CT. Can pursue this down the line.  - Patient declined psycho-onc referral  - Follow-up following each treatment mid-cycle.     - Requested restaging PET CT to be done following 3 cycles to assess response.

## 2025-03-24 NOTE — RESULTS/DATA
[FreeTextEntry1] : - CT C/A/P with contrast 9/26/24: Large left lung mass concerning for primary lung neoplasm. 1.3 cm gastrohepatic lymph node, and 1.4cm lytic lesion in the left iliac bone concerning for distal metastasis.   - CT Head without contrast: 9/26/24: 6.1 x 5 x 3.8 cm mostly hypoattenuating mass lesion in the left anterior frontal lobe, crossing the anterior falx into the medial right temporal lobe with surrounding vasogenic edema. There is severe mass effect on the surrounding parenchyma and on the ventricular system with marked left-to-right midline shift of 1.6cm. Recommend contrast-enhanced MRI brain for further characterization.   - MRI brain 9/30/24: 6.5 x 5.1 x 4.8 cm peripherally enhancing, centrally necrotic mass within the left anterior frontal lobe demonstrating regions of restricted diffusion. Findings are suspicious for the presence of glioblastoma multiform. Differential diagnosis includes metastatic disease.  Right midline shift of 1cm. Rightward subfalcine herniation of 1.6cm. Basal cisterns are visualized. Mild asymmetric dilation of the right lateral ventricle.   - Surgical Pathology 10/1/24: 1. Left frontal tumor on dura, biopsy: Metastatic non-small cell carcinoma, compatible with adenocarcinoma of lung origin. 2. Left frontal tumor on dura, biopsy: Metastatic non-small cell carcinoma, compatible with adenocarcinoma of lung origin. 3. Left frontal brain tumor on dura, excision: Metastatic non-small cell carcinoma, compatible with adenocarcinoma of lung origin.  Note: Sections show a high-grade malignant neoplasm with predominantly solid growth and scattered foci of necrosis.  IHC: neoplastic cells are diffusely positive for high-molecular weight (HMW), cytokeratin, CK7, TTF-1, and p63, and are negative for CK20, GATA3, p40, NKX3.1, and PAAX8. GFAP highlights background brain parenchyma.  PDL1 TPS >95%.   - Cytology 10/1/24: Brain, left, frontal, fluid: Positive for malignant cells. Carcinoma.   - CT head without contrast 10/2/24: Postoperative changes compatible with left frontal craniotomy. Some low attenuation identified in the postop region with some associated air as well as subtle high attenuation. this area of high changes compatible areas of hemorrhage on postop material. Surrounding edema is again seen. Decreased mass effect on the anteior aspect of both lateral ventricle is seen. Residual right left shift is seen (1.1cm).   -MR Brain 10/2/24: Postsurgical changes in the left frontal region status post left frontal craniotomy and mass resection.  Left frontal vasogenic edema with mass effect on the left lateral ventricle and approximately 1cm of rightward midline shift similar to presurgical exam.  Focal plaque like region of enhancement in the posterior aspect of the resection cavity possibly representing residual enhancing tumor versus postoperative changes. Recommend continued follow-up.   - PET/CT 10/16/24:  1. Large peripherally FDG-avid, centrally photopenic multiloculated mass in the left perihilar/paramediastinal left upper lobe corresponds to known malignancy. Separate FDG-avid left upper lobe nodule is compatible with metastasis.  2. FDG-avid left perihilar, bilateral mediastinal, bilateral cervical, and distal right external iliac lymph nodes. The left perihilar and AP window lymph nodes are compatible with metastatic disease. Small FDG-avid bilateral mediastinal/cervical and right external iliac lymph nodes are indeterminate. Differential diagnosis includes inflammatory and/or neoplastic etiologies. Cervical/supraclavicular lymph nodes are accessible to an ultrasound-guided percutaneous needle biopsy.  3. Enlarged prostate gland with right greater than left hypermetabolism. Differential diagnosis includes infectious/inflammatory etiologies and prostate carcinoma. Urology consultation is recommended.  4. Nonspecific FDG-avid thickening of upper esophagus and nonspecific hypermetabolism of fundus and body of stomach, most intense along the lesser curvature, are indeterminate. Endoscopy is recommended to exclude neoplasm.  5. Mild, diffuse hypermetabolism in nonenlarged thyroid gland suggests thyroiditis. Please correlate clinically.  -PET/CT 12/23/24:   1. Compared with PET/CT 10/16/2024, there has been interval response to therapy with residual FDG avid disease evident. 2. Resolution of FDG avid cervical lymph nodes. 3. Decrease in number of FDG avid lymph nodes in the mediastinum and hilum. Small lymph nodes in the AP window remain evident, with mild residual FDG uptake. 4. Decrease in size of a left perihilar mass and left upper lobe mass, with decrease in extent of FDG avidity and with focal areas of increasing FDG avidity when compared to prior exam. 5. Similar size and mildly decreased activity in an apical left upper lobe nodule. 6. Decrease in size and FDG uptake in the prostate, possibly due to resolving infection. Decrease in FDG avidity of a right external iliac lymph node, which may have been reactive. Recommend urology consultation if not previously performed. 7. Resolution of FDG avidity in a thickened upper esophagus. Increase in nonspecific hypermetabolism in the stomach. Recommend correlation with endoscopy. 8. Similar mild diffuse hypermetabolism in the thyroid. This can be correlated with TSH.  -MRI Brain 1/21/25:  No findings suspicious for tumor progression. Indeterminate 6 mm focus of nodular enhancement at the posterior margin of the left frontal resection bed. Attention on follow-up exams is advised. No findings suspicious for new sites of metastatic disease.  -PET/CT 3/9/25:  Compared to FDG-PET/CT scan dated 12/23/2024:  1. FDG-avid partially necrotic left upper lobe mass and ipsilateral mediastinal/hilar lymphadenopathy, increased as compared to prior study, with associated increased left upper lobe atelectasis. Findings are compatible with progression of disease. Previously noted FDG-avid left apical nodule is not delineated within the atelectatic left upper lobe/lymphadenopathy.  2. Nonspecific gastric hypermetabolism is decreased.  3. Nonspecific, mild FDG avidity and nonenlarged prostate gland, similar in metabolism and less extensive.

## 2025-03-24 NOTE — PHYSICAL EXAM
[Restricted in physically strenuous activity but ambulatory and able to carry out work of a light or sedentary nature] : Status 1- Restricted in physically strenuous activity but ambulatory and able to carry out work of a light or sedentary nature, e.g., light house work, office work [Normal] : affect appropriate [de-identified] : No icterus  [de-identified] : MMM O/P Clear [de-identified] : Supple No LAD [de-identified] : CTAB, good airflow ausculated [de-identified] : S1 S2 [de-identified] : Trace left ankle edema.  [de-identified] : Soft NT/ND No masses [de-identified] : No spine/CVA tenderness  [de-identified] : Ambulatory.

## 2025-03-24 NOTE — ASSESSMENT
[FreeTextEntry1] : 63M former smoker with Stage IV/metastatic NSCLC with adenocarcinoma histology with a large ABAD primary tumor with evidence of metastatic disease involving the intrathoracic lymph nodes, cervical lymph nodes and brain.    Patient is status post craniotomy resection of a large left frontal brain tumor metastasis October 2024.  Tumor tested PD-L1 high-positive and Foundation negative for actionable mutations (+ BRAF G466A, NF1, TP53, among others) and TMB-High. Received post-op GK-SRS in late-October 2024. He started first-line systemic therapy with combination of chemotherapy and immunotherapy with carboplatin, pemetrexed, and pembrolizumab on 11/8/24; achieved nice [place]. Treated with 4 cycles of triplet therapy through early January 2025 followed by pemetrexed/pembrolizumab maintenance as of late January 2025.  Treated with maintenance therapy through February 2025.  Developed disease progression in March 2025.  Started second line docetaxel and ramucirumab on 3/14/25. Thoracic tumor board recommendation for IP bronchoscopy and possible palliative RT for ABAD mass causing atelectasis.  Recommend: - Continue second line treatment with Docetaxel 60mg/m2 and Ramucirumab 7.5mg/kg IV every 3 weeks.  For cycle 2 on 4/4.  - Potential treatment with nab-paclitaxel discussed should that become necessary.   - Bloodwork today.  - Continue dexamethasone in the stacey-chemo setting the day before and the day after chemo.  - Using albuterol inhaler PRN wheezing.   - Continue follow up with Dr. Reyes and Dr. Robb, s/p craniotomy resection of large frontal tumor 10/1/24 and s/p GK-SRS completed 10/31/24. Patient stopped the Keppra already. For 3-month Brain MRI in April. Reminded patient to make appt for the MRI brain.  - There was an apparent lytic lesion of the left iliac bone noted on inpatient CT scan, however, this was not noted to be hypermetabolic on his initial PET CT scan.  Would avoid the addition of a bone modifying agent at this juncture - PET CT scan noted non-FDG avid thickening of the upper esophagus and nonspecific avidity of the stomach which were felt to be indeterminant. Previously recommended GI evaluation for EGD.  Patient was going to pursue this.  Discussed he can likely delay this for now given that there was improvement noted on March PET/CT. Can pursue this down the line.  - Patient declined psycho-onc referral  - Follow-up following each treatment mid-cycle.     - Requested restaging PET CT to be done following 3 cycles to assess response.

## 2025-04-05 NOTE — END OF VISIT
[FreeTextEntry3] : Today's medical care services serve as the continuing focal point for needed health care services that are part of ongoing care related to a patient's serious and complex condition (Endobronchial tumor and related disease burden). [Time Spent: ___ minutes] : I have spent [unfilled] minutes of time on the encounter which excludes teaching and separately reported services.

## 2025-04-05 NOTE — PHYSICAL EXAM
[No Acute Distress] : no acute distress [Normal Oropharynx] : normal oropharynx [Normal Appearance] : normal appearance [No Neck Mass] : no neck mass [Normal Rate/Rhythm] : normal rate/rhythm [Normal S1, S2] : normal s1, s2 [No Murmurs] : no murmurs [No Resp Distress] : no resp distress [No Abnormalities] : no abnormalities [Benign] : benign [Normal Gait] : normal gait [No Clubbing] : no clubbing [No Cyanosis] : no cyanosis [No Edema] : no edema [FROM] : FROM [Normal Color/ Pigmentation] : normal color/ pigmentation [No Focal Deficits] : no focal deficits [Oriented x3] : oriented x3 [Normal Affect] : normal affect [TextBox_68] : Decreased Left upper lung field breath sounds. no wheeze.

## 2025-04-05 NOTE — HISTORY OF PRESENT ILLNESS
Call from Dr. Jyoti Huff office. There is a change in patient's appointment date and time. New virtual appointment info:  7-      2:20pm  This information was given to patient. He stated understanding and had no questions.
[TextBox_4] : Interventional Pulmonology Consultation Note First Visit with IP: Apr  3 2025  3:00PM   Mr. BERUMEN is a 63 year old man non-smoker with PMH of diabetes type 2 was admitted to [facility] 9/2020 for found to have central necrotic mass on the anterior left frontal lobe which was found after craniotomy to be non-small cell carcinoma, adenocarcinoma, PD-L1 greater than 95% with disease progression now on second line treatment.  Consulted by medical oncology for endobronchial disease  Initially found to have left anterior frontal lobe brain mass with midline shift and underwent left craniotomy diagnosing adenocarcinoma of the lung.  The time also noted to have a 4.8 cm left lung mass.  He was started on carboplatin prior pemetrexed pembrolizumab 11/8/2024 with disease control.  Was placed on pemetrexed and pembrolizumab maintenance in January 2025.  Unfortunately had progression of disease March 2025 and starting docetaxel/ramucirumab.  PET scan done on 3/9/2025 with disease progression most notably in endobronchial tumor in left upper lobe collapse with complete atelectasis.  Previous imaging 12/23/2024 demonstrated left upper lobe tumor but no collapse  Letty denies fevers chills hemoptysis.  No shortness of breath.     his oncologic history is notable for:   - [ ]     Today He is [ ]

## 2025-04-14 NOTE — ASSESSMENT
[FreeTextEntry1] : 63M former smoker with Stage IV/metastatic NSCLC with adenocarcinoma histology with a large ABAD primary tumor with evidence of metastatic disease involving the intrathoracic lymph nodes, cervical lymph nodes and brain.    Patient is status post craniotomy resection of a large left frontal brain tumor metastasis October 2024.  Tumor tested PD-L1 high-positive and Foundation negative for actionable mutations (+ BRAF G466A, NF1, TP53, among others) and TMB-High. Received post-op GK-SRS in late-October 2024. He started first-line systemic therapy with combination of chemotherapy and immunotherapy with carboplatin, pemetrexed, and pembrolizumab on 11/8/24; achieved nice [place]. Treated with 4 cycles of triplet therapy through early January 2025 followed by pemetrexed/pembrolizumab maintenance as of late January 2025.  Treated with maintenance therapy through February 2025.  Developed disease progression in March 2025.  Started second line docetaxel and ramucirumab on 3/14/25. Thoracic tumor board recommendation for IP bronchoscopy and possible palliative RT for ABAD mass causing atelectasis.  Recommend: - Continue second line treatment with Docetaxel 60mg/m2 and Ramucirumab 7.5mg/kg IV every 3 weeks.  For cycle 3 on 4/25.  - Potential treatment with nab-paclitaxel discussed should that become necessary.   - Bloodwork today.  - Perioidic UA while on ramucirumab.  - Continue dexamethasone in the stacey-chemo setting the day before and the day after chemo.  - Use cold compress, ibuprofen PRN, hydrocortisone cream PRN for right forearm phlebitis likely associated with last IV. Discussed avoiding that arm for next IV and also discussed mediport placement which he declines.  - Epiphora. Advised warm compress to eyes, light massage downward starting at inner cantus of eyes. Can use artificial tears as needed. See ophthalmologist if persists/worsens. Likely related to docetaxel therapy.  - Anemia. From chemo. No iron deficiency on previous bloodwork. - Elevated blood pressure. Continue to monitor for need for antihypertensives while on ramucirumab. May need to follow up with PMD. - Scheduled for bronchoscopy, tumor debulking, and airway dilation with Dr. Portillo on 4/23.  - Using albuterol inhaler PRN wheezing.   - Continue follow up with Dr. Reyes and Dr. Robb, s/p craniotomy resection of large frontal tumor 10/1/24 and s/p GK-SRS completed 10/31/24. Patient stopped the Keppra already. For 3-month Brain MRI in April. Again reminded patient to make appt for the MRI brain.  - There was an apparent lytic lesion of the left iliac bone noted on inpatient CT scan, however, this was not noted to be hypermetabolic on his initial PET CT scan.  Would avoid the addition of a bone modifying agent at this juncture - PET CT scan noted non-FDG avid thickening of the upper esophagus and nonspecific avidity of the stomach which were felt to be indeterminant. Previously recommended GI evaluation for EGD.  Patient was going to pursue this.  Discussed he can likely delay this for now given that there was improvement noted on March PET/CT. Can pursue this down the line.  - Patient declined psycho-onc referral. Mood seems good now.  - Follow-up following each treatment mid-cycle.     - Scheduled for restaging PET CT to be done 4/29 following 3 cycles to assess response.

## 2025-04-14 NOTE — HISTORY OF PRESENT ILLNESS
[Disease: _____________________] : Disease: [unfilled] [AJCC Stage: ____] : AJCC Stage: [unfilled] [de-identified] : Mr. Tomlin is a 63-year-old man, non-smoker, with PMH of HTN and DM2 who presented to [facility]26/24 with decreased intake and altered mental status x 1 week. CT head followed by brain MRI showed a 6.5 x 5.1 x 4.8 cm peripherally enhancing, centrally necrotic mass in the left anterior frontal lobe, with rightward midline shift. 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 On 10/1/24 he underwent left craniotomy for tumor resection. Pathology of the excised left frontal brain tumor was interpreted as showing non-small cell carcinoma compatible with adenocarcinoma of lung origin. PDL1 TPS >95%.  Foundation negative for actionable mutations (+ BRAF G466A, NF1, TP53, among others) and TMB-High. Treated with GK-SRS to brain in late October 2024.  Started first line combination chemotherapy and immunotherapy with Carboplatin/Pemetrexed and Pembrolizumab 11/8/24.  Achieved [place].  Treated with 4 cycles of triplet therapy through early January 2025 followed by pemetrexed/pembrolizumab maintenance as of late January 2025.  Treated with maintenance therapy through February 2025.  Developed disease progression in March 2025 and started second line docetaxel/ramucirumab on 3/14/25. Case discussed at interdisciplinary tumor board with recommendation for bronchoscopy with IP due to tumor narrowing of the airway, and possible palliative thoracic RT afterwards.   [de-identified] : - Left frontal brain tumor excision: 10/1/2024: Metastatic non-small cell carcinoma compatible with adenocarcinoma of lung origin. PD-L1 high-positive > 95%.  Foundation:  + BRAF G466A, NF1, TP53, among others.  TMB-High.  [de-identified] : Mr. Tomlin is on second line systemic therapy with docetaxel and ramucirumab started 3/14/25, for metastatic non-small cell lung adenocarcinoma with resected brain metastasis s/p GK-SRS in late Oct 2024.  He is here for mid-cycle evaluation after receiving cycle 2 docetaxel and ramucirumab on 4/4/25.  He is planned for bronchoscopy, tumor debulking, and airway dilation on 4/23 with Dr. Portillo. Pt reports fatigue from treatment but overall denies any change in his ability to do daily activities. Denies feeling dyspneic walking around the house. Does not need to walk stairs.  Right forearm site of previous IV site reported with redness and irritation.  Pt states not interested in getting a port. Reports watery eyes especially on the left.  Reports mild left foot swelling that improves with leg elevation.  Denies F/C/N/V, cough, diarrhea, constipation, bleeding, worsening of blood pressure, or worsening of edema. Denies headache, change in vision or balance.

## 2025-04-14 NOTE — PHYSICAL EXAM
[Restricted in physically strenuous activity but ambulatory and able to carry out work of a light or sedentary nature] : Status 1- Restricted in physically strenuous activity but ambulatory and able to carry out work of a light or sedentary nature, e.g., light house work, office work [Normal] : affect appropriate [de-identified] : No icterus. Mildly watery eyes.  [de-identified] : MMM O/P Clear [de-identified] : Supple No LAD [de-identified] : Mildly diminished to left side. Clear to right.  [de-identified] : S1 S2 [de-identified] : Trace left foot and ankle edema.  [de-identified] : Soft NT/ND No masses [de-identified] : No spine/CVA tenderness  [de-identified] : Ambulatory.  [de-identified] : Right inner forearm with erythema, warmth, and scant papular rash on areas of erythema. No skin necrosis. Skin is intact.

## 2025-04-14 NOTE — PHYSICAL EXAM
[] : no respiratory distress [Exaggerated Use Of Accessory Muscles For Inspiration] : no accessory muscle use [Heart Rate And Rhythm] : heart rate and rhythm were normal [Arterial Pulses Normal] : the arterial pulses were normal [Abdomen Soft] : soft [Nondistended] : nondistended [Nail Clubbing] : no clubbing  or cyanosis of the fingernails [Normal] : no focal deficits [Oriented To Time, Place, And Person] : oriented to person, place, and time

## 2025-04-14 NOTE — REVIEW OF SYSTEMS
[Fever] : no fever [Chills] : no chills [Night Sweats] : no night sweats [Fatigue] : fatigue [Recent Change In Weight] : ~T recent weight change [Confused] : confusion [Dizziness] : no dizziness [Fainting] : no fainting [Difficulty Walking] : no difficulty walking [Negative] : Allergic/Immunologic [FreeTextEntry2] : 10 to 15 pounds weight loss [de-identified] : slight confusion or memory [Blurred Vision: Grade 0] : Blurred Vision: Grade 0 [Mucositis Oral: Grade 0] : Mucositis Oral: Grade 0  [Xerostomia: Grade 0] : Xerostomia: Grade 0 [Oral Pain: Grade 0] : Oral Pain: Grade 0 [Concentration Impairment: Grade 0] : Concentration Impairment: Grade 0 [Dizziness: Grade 0] : Dizziness: Grade 0  [Headache: Grade 0] : Headache: Grade 0 [Lethargy: Grade 0] : Lethargy: Grade 0

## 2025-04-14 NOTE — HISTORY OF PRESENT ILLNESS
[FreeTextEntry1] : Rt hx: GKRS to LEFT frontal met 24Gy over 3Fxs 10/29-10/31/2024  INITIAL CONSULTATION: OCTOBER 17, 2024 This is a 63 y/o M with a h/o HTN, DM2 who Presents for consultation to discuss the possible role of radiation therapy in his care.   The course of illness began when he presented to [facility] with altered mental status in September 2024. Reports he was having difficulty participating in work related activities thus he stopped going, also with decreased appetite and was staying in bed most days. Family also noted he was not his usual self.  During workup in the ER [facility] on 9/28/2024 appreciated an INTRA-AXIAL: 6.1 x 5.0 x 3.8 cm mostly hypoattenuating mass lesion in the left anterior frontal lobe crossing the anterior falx into the medial right temporal lobe with surrounding vasogenic edema. There is severe mass effect on the surrounding parenchyma and on the ventricular system with marked left-to-right midline shift of 1.6 cm. Metastatic workup was completed to include CT chest/abdomen on said day IMPRESSION: Large left lung mass concerning for primary lung neoplasm.1.3 cm gastrohepatic lymph node, and 1.4 cm lytic lesion in the left iliac bone concerning for distal metastasis.  MRI brain w w/o contrast 9/30/2024IMPRESSION:6.5 x 5.1 x 4.8 cm (maximal AP x TV x CC dimensions) peripherally enhancing, centrally necrotic mass within the left anterior frontal lobe demonstrating regions of restricted diffusion. Findings are suspicious for the presence of glioblastoma multiform. Differential diagnosis includes metastatic disease. Rightward midline shift of 1 cm. Rightward subfalcine herniation of 1.6 cm.  Patient was admitted for treatment and ultimately underwent a Left frontotemporal craniotomy and excision of tumor with MR stereotactic and ultrasonic guidance with Dr. Ty Reyes on 10/1/2024 PATH: Left frontal tumor on dura, biopsy Metastatic non-small cell carcinoma, compatible with adenocarcinoma of lung origin   MRI brain w w/o contrast 10/2/2024 IMPRESSION: Postsurgical changes in the left frontal region status post left frontal craniotomy and mass resection. Left frontal vasogenic edema with mass effect on the left lateral ventricle and approximately 1.0 cm of rightward midline shift similar to presurgical exam. Focal plaque like region of enhancement in the posterior aspect of the resection cavity possibly representing residual enhancing tumor versus postoperative changes. Recommend continued follow-up.  VISIT DATED: 10/17/2024 Patient presents for consult with Dr. Robb. Pt reports that he has lost 10 to 15 pounds since diagnosis. He does not sleep at night and doesn't have much pain. Spouse reports that he has unstable gait but the pt denies it. Pt used to be a smoker before the surgery smoking cigarettes for a couple years and switching over to cigars. Pt stiches are intact no redness or swelling surrounding the area. Pt wants to remain positive during this of treatment. Reports  Patient scheduled with Dr. Josh Roman (heme/onco) 10/18/2024  VISIT DATED: 1/23/2025 Mr. Tomlin presents for progress check and evaluation he is s/p GKRS to LEFT frontal met 24Gy over 3Fxs 10/29-10/31/2024  1/21/2025 MRI Brain Postsurgical changes in the left frontal region status post left frontal craniotomy and mass resection. Left frontal vasogenic edema with mass effect on the left lateral ventricle and approximately 1.0 cm of rightward midline shift similar to presurgical exam. Focal plaque like region of enhancement in the posterior aspect of the resection cavity possibly representing residual enhancing tumor versus postoperative changes. Recommend continued follow-up.  Follows with Dr. Roman and started first-line systemic therapy with combination of chemotherapy and immunotherapy with carboplatin, pemetrexed, and pembrolizumab on 11/8/24. PET ct  December 2024 12/23/24 PET/CT Compared with PET/CT 10/16/2024, there has been interval response to therapy with residual FDG avid disease evident  12/21/25 MRI Brain Final read pending however on personal review of imaging, there is improvement in size and ring rim enhancement compatible with interval response to therapy.   He denies any seizure episodes or headaches, his wife reports he still has some episodes of subtle confusion, he reports weakness in the left lower extremity and falls twice since surgery.   VISIT DATED: 4/24/2025 Mr. Tomlin present for routine follow-up and progress check with cranial images for review. Patient with metastatic NSCL adeno and completed GKRS to LEFT frontal met 24Gy over 3 Fxs 10/29-10/31/2024. States *****  He continues to follow with Dr. Josh Roman on second line treatment with and Ramucirumab started 3/14/2025 after FDG PET ct on 3/9/2025 appreciated POD. Scheduled for restaging PET CT to be done 4/29 following 3 cycles to assess response.  MRI brain w w/o contrast ?????

## 2025-04-17 NOTE — HISTORY OF PRESENT ILLNESS
[FreeTextEntry1] : Rt hx: GKRS to LEFT frontal met 24Gy over 3Fxs 10/29-10/31/2024  INITIAL CONSULTATION: OCTOBER 17, 2024 This is a 61 y/o M with a h/o HTN, DM2 who Presents for consultation to discuss the possible role of radiation therapy in his care.   The course of illness began when he presented to [facility] with altered mental status in September 2024. Reports he was having difficulty participating in work related activities thus he stopped going, also with decreased appetite and was staying in bed most days. Family also noted he was not his usual self.  During workup in the ER [facility] on 9/28/2024 appreciated an INTRA-AXIAL: 6.1 x 5.0 x 3.8 cm mostly hypoattenuating mass lesion in the left anterior frontal lobe crossing the anterior falx into the medial right temporal lobe with surrounding vasogenic edema. There is severe mass effect on the surrounding parenchyma and on the ventricular system with marked left-to-right midline shift of 1.6 cm. Metastatic workup was completed to include CT chest/abdomen on said day IMPRESSION: Large left lung mass concerning for primary lung neoplasm.1.3 cm gastrohepatic lymph node, and 1.4 cm lytic lesion in the left iliac bone concerning for distal metastasis.  MRI brain w w/o contrast 9/30/2024IMPRESSION:6.5 x 5.1 x 4.8 cm (maximal AP x TV x CC dimensions) peripherally enhancing, centrally necrotic mass within the left anterior frontal lobe demonstrating regions of restricted diffusion. Findings are suspicious for the presence of glioblastoma multiform. Differential diagnosis includes metastatic disease. Rightward midline shift of 1 cm. Rightward subfalcine herniation of 1.6 cm.  Patient was admitted for treatment and ultimately underwent a Left frontotemporal craniotomy and excision of tumor with MR stereotactic and ultrasonic guidance with Dr. Ty Reyes on 10/1/2024 PATH: Left frontal tumor on dura, biopsy Metastatic non-small cell carcinoma, compatible with adenocarcinoma of lung origin   MRI brain w w/o contrast 10/2/2024 IMPRESSION: Postsurgical changes in the left frontal region status post left frontal craniotomy and mass resection. Left frontal vasogenic edema with mass effect on the left lateral ventricle and approximately 1.0 cm of rightward midline shift similar to presurgical exam. Focal plaque like region of enhancement in the posterior aspect of the resection cavity possibly representing residual enhancing tumor versus postoperative changes. Recommend continued follow-up.  VISIT DATED: 10/17/2024 Patient presents for consult with Dr. Robb. Pt reports that he has lost 10 to 15 pounds since diagnosis. He does not sleep at night and doesn't have much pain. Spouse reports that he has unstable gait but the pt denies it. Pt used to be a smoker before the surgery smoking cigarettes for a couple years and switching over to cigars. Pt stiches are intact no redness or swelling surrounding the area. Pt wants to remain positive during this of treatment. Reports  Patient scheduled with Dr. Josh Roman (heme/onco) 10/18/2024  VISIT DATED: 1/23/2025 Mr. Tomlin presents for progress check and evaluation he is s/p GKRS to LEFT frontal met 24Gy over 3Fxs 10/29-10/31/2024  1/21/2025 MRI Brain Postsurgical changes in the left frontal region status post left frontal craniotomy and mass resection. Left frontal vasogenic edema with mass effect on the left lateral ventricle and approximately 1.0 cm of rightward midline shift similar to presurgical exam. Focal plaque like region of enhancement in the posterior aspect of the resection cavity possibly representing residual enhancing tumor versus postoperative changes. Recommend continued follow-up.  Follows with Dr. Roman and started first-line systemic therapy with combination of chemotherapy and immunotherapy with carboplatin, pemetrexed, and pembrolizumab on 11/8/24. PET ct  December 2024 12/23/24 PET/CT Compared with PET/CT 10/16/2024, there has been interval response to therapy with residual FDG avid disease evident  12/21/25 MRI Brain Final read pending however on personal review of imaging, there is improvement in size and ring rim enhancement compatible with interval response to therapy.   He denies any seizure episodes or headaches, his wife reports he still has some episodes of subtle confusion, he reports weakness in the left lower extremity and falls twice since surgery.   VISIT DATED: 4/24/2025 Mr. Tomlin present for routine follow-up and progress check with cranial images for review. Patient with metastatic NSCL adeno and completed GKRS to LEFT frontal met 24Gy over 3 Fxs 10/29-10/31/2024. States *****  He continues to follow with Dr. Josh Roman on second line treatment with and Ramucirumab started 3/14/2025 after FDG PET ct on 3/9/2025 appreciated POD. Scheduled for restaging PET CT to be done 4/29 following 3 cycles to assess response.  MRI brain w w/o contrast ?????

## 2025-04-17 NOTE — REVIEW OF SYSTEMS
[Fever] : no fever [Chills] : no chills [Night Sweats] : no night sweats [Dizziness] : no dizziness [Fainting] : no fainting [Difficulty Walking] : no difficulty walking [FreeTextEntry2] : 10 to 15 pounds weight loss [de-identified] : slight confusion or memory

## 2025-05-07 NOTE — PHYSICAL EXAM
[General Appearance - Alert] : alert [] : no respiratory distress [de-identified] : EXAM LIMITED d/t visit TELEHEALTH

## 2025-05-07 NOTE — HISTORY OF PRESENT ILLNESS
[Home] : at home, [unfilled] , at the time of the visit. [Medical Office: ([facility])___] : at the medical office located in  [Telehealth (audio & video)] : This visit was provided via telehealth using real-time 2-way audio visual technology. [Verbal consent obtained from patient] : the patient, [unfilled] [FreeTextEntry1] : Rt hx: GKRS to LEFT frontal surgical cavity 24Gy over 3Fxs 10/29-10/31/2024  INITIAL CONSULTATION: OCTOBER 17, 2024 This is a 63 y/o M with a h/o HTN, DM2 who Presents for consultation to discuss the possible role of radiation therapy in his care.   The course of illness began when he presented to [facility] with altered mental status in September 2024. Reports he was having difficulty participating in work related activities thus he stopped going, also with decreased appetite and was staying in bed most days. Family also noted he was not his usual self.  During workup in the ER [facility] on 9/28/2024 appreciated an INTRA-AXIAL: 6.1 x 5.0 x 3.8 cm mostly hypoattenuating mass lesion in the left anterior frontal lobe crossing the anterior falx into the medial right temporal lobe with surrounding vasogenic edema. There is severe mass effect on the surrounding parenchyma and on the ventricular system with marked left-to-right midline shift of 1.6 cm. Metastatic workup was completed to include CT chest/abdomen on said day IMPRESSION: Large left lung mass concerning for primary lung neoplasm.1.3 cm gastrohepatic lymph node, and 1.4 cm lytic lesion in the left iliac bone concerning for distal metastasis.  MRI brain w w/o contrast 9/30/2024IMPRESSION:6.5 x 5.1 x 4.8 cm (maximal AP x TV x CC dimensions) peripherally enhancing, centrally necrotic mass within the left anterior frontal lobe demonstrating regions of restricted diffusion. Findings are suspicious for the presence of glioblastoma multiform. Differential diagnosis includes metastatic disease. Rightward midline shift of 1 cm. Rightward subfalcine herniation of 1.6 cm.  Patient was admitted for treatment and ultimately underwent a Left frontotemporal craniotomy and excision of tumor with MR stereotactic and ultrasonic guidance with Dr. Ty Reyes on 10/1/2024 PATH: Left frontal tumor on dura, biopsy Metastatic non-small cell carcinoma, compatible with adenocarcinoma of lung origin   MRI brain w w/o contrast 10/2/2024 IMPRESSION: Postsurgical changes in the left frontal region status post left frontal craniotomy and mass resection. Left frontal vasogenic edema with mass effect on the left lateral ventricle and approximately 1.0 cm of rightward midline shift similar to presurgical exam. Focal plaque like region of enhancement in the posterior aspect of the resection cavity possibly representing residual enhancing tumor versus postoperative changes. Recommend continued follow-up.  VISIT DATED: 10/17/2024 Patient presents for consult with Dr. Robb. Pt reports that he has lost 10 to 15 pounds since diagnosis. He does not sleep at night and doesn't have much pain. Spouse reports that he has unstable gait but the pt denies it. Pt used to be a smoker before the surgery smoking cigarettes for a couple years and switching over to cigars. Pt stiches are intact no redness or swelling surrounding the area. Pt wants to remain positive during this of treatment. Reports  Patient scheduled with Dr. Josh Roman (heme/onco) 10/18/2024  VISIT DATED: 1/23/2025 Mr. Tomlin presents for progress check and evaluation he is s/p GKRS to LEFT frontal met 24Gy over 3Fxs 10/29-10/31/2024  1/21/2025 MRI Brain Postsurgical changes in the left frontal region status post left frontal craniotomy and mass resection. Left frontal vasogenic edema with mass effect on the left lateral ventricle and approximately 1.0 cm of rightward midline shift similar to presurgical exam. Focal plaque like region of enhancement in the posterior aspect of the resection cavity possibly representing residual enhancing tumor versus postoperative changes. Recommend continued follow-up.  Follows with Dr. Roman and started first-line systemic therapy with combination of chemotherapy and immunotherapy with carboplatin, pemetrexed, and pembrolizumab on 11/8/24. PET ct December 2024 12/23/24 PET/CT Compared with PET/CT 10/16/2024, there has been interval response to therapy with residual FDG avid disease evident  12/21/25 MRI Brain Final read pending however on personal review of imaging, there is improvement in size and ring rim enhancement compatible with interval response to therapy.  He denies any seizure episodes or headaches, his wife reports he still has some episodes of subtle confusion, he reports weakness in the left lower extremity and falls twice since surgery.  VISIT DATED: 5/7/2025 Mr. Tomlin presents for routine f/u and progress check with completed cranial images for review. In the interim he underwent tumor debulking ABAD and cryo biopsy with Dr. Portillo on 4/23/2025 PATH favoring non-small cell carcinoma, adenocarcinoma. Post discharge CXR noted for pneumothorax. CT chest 4/25/2025 IMPRESSION: Moderate left pneumothorax with partial atelectasis of the left upper lobe. Patient was asymptomatic and discharged home. Then on 5/1/2025 patient BIBA after found down by wife with convulsions now on Vimpat 200mg BID with f/u scheduled with Dr. Frankel on 5/19/2025. Does recall  being on AED post op   Continues to follow with Dr. Roman now on second line Docetaxel and Ramucirumab started on 3/14/25 after PETct on 3/9/2025 was noted for POD. Recent PETct on 4/2/2025 with stable findings.  Today doing overall well. Does note being a little slow in his responses. Denies N+V, Headaches/unilateral extremity weakness/memory changes/gait disturbance/bowel/bladder dysfunction or other neurologic symptoms.   MRI brain w w/o contrast 5/6/2025 IMPRESSION: No new abnormal enhancement or signal to suggest recurrence of disease or new disease.

## 2025-05-07 NOTE — HISTORY OF PRESENT ILLNESS
[Home] : at home, [unfilled] , at the time of the visit. [Medical Office: ([facility])___] : at the medical office located in  [Telehealth (audio & video)] : This visit was provided via telehealth using real-time 2-way audio visual technology. [Verbal consent obtained from patient] : the patient, [unfilled] [FreeTextEntry1] : Rt hx: GKRS to LEFT frontal surgical cavity 24Gy over 3Fxs 10/29-10/31/2024  INITIAL CONSULTATION: OCTOBER 17, 2024 This is a 63 y/o M with a h/o HTN, DM2 who Presents for consultation to discuss the possible role of radiation therapy in his care.   The course of illness began when he presented to [facility] with altered mental status in September 2024. Reports he was having difficulty participating in work related activities thus he stopped going, also with decreased appetite and was staying in bed most days. Family also noted he was not his usual self.  During workup in the ER [facility] on 9/28/2024 appreciated an INTRA-AXIAL: 6.1 x 5.0 x 3.8 cm mostly hypoattenuating mass lesion in the left anterior frontal lobe crossing the anterior falx into the medial right temporal lobe with surrounding vasogenic edema. There is severe mass effect on the surrounding parenchyma and on the ventricular system with marked left-to-right midline shift of 1.6 cm. Metastatic workup was completed to include CT chest/abdomen on said day IMPRESSION: Large left lung mass concerning for primary lung neoplasm.1.3 cm gastrohepatic lymph node, and 1.4 cm lytic lesion in the left iliac bone concerning for distal metastasis.  MRI brain w w/o contrast 9/30/2024IMPRESSION:6.5 x 5.1 x 4.8 cm (maximal AP x TV x CC dimensions) peripherally enhancing, centrally necrotic mass within the left anterior frontal lobe demonstrating regions of restricted diffusion. Findings are suspicious for the presence of glioblastoma multiform. Differential diagnosis includes metastatic disease. Rightward midline shift of 1 cm. Rightward subfalcine herniation of 1.6 cm.  Patient was admitted for treatment and ultimately underwent a Left frontotemporal craniotomy and excision of tumor with MR stereotactic and ultrasonic guidance with Dr. Ty Reyes on 10/1/2024 PATH: Left frontal tumor on dura, biopsy Metastatic non-small cell carcinoma, compatible with adenocarcinoma of lung origin   MRI brain w w/o contrast 10/2/2024 IMPRESSION: Postsurgical changes in the left frontal region status post left frontal craniotomy and mass resection. Left frontal vasogenic edema with mass effect on the left lateral ventricle and approximately 1.0 cm of rightward midline shift similar to presurgical exam. Focal plaque like region of enhancement in the posterior aspect of the resection cavity possibly representing residual enhancing tumor versus postoperative changes. Recommend continued follow-up.  VISIT DATED: 10/17/2024 Patient presents for consult with Dr. Robb. Pt reports that he has lost 10 to 15 pounds since diagnosis. He does not sleep at night and doesn't have much pain. Spouse reports that he has unstable gait but the pt denies it. Pt used to be a smoker before the surgery smoking cigarettes for a couple years and switching over to cigars. Pt stiches are intact no redness or swelling surrounding the area. Pt wants to remain positive during this of treatment. Reports  Patient scheduled with Dr. Josh Roman (heme/onco) 10/18/2024  VISIT DATED: 1/23/2025 Mr. Tomlin presents for progress check and evaluation he is s/p GKRS to LEFT frontal met 24Gy over 3Fxs 10/29-10/31/2024  1/21/2025 MRI Brain Postsurgical changes in the left frontal region status post left frontal craniotomy and mass resection. Left frontal vasogenic edema with mass effect on the left lateral ventricle and approximately 1.0 cm of rightward midline shift similar to presurgical exam. Focal plaque like region of enhancement in the posterior aspect of the resection cavity possibly representing residual enhancing tumor versus postoperative changes. Recommend continued follow-up.  Follows with Dr. Roman and started first-line systemic therapy with combination of chemotherapy and immunotherapy with carboplatin, pemetrexed, and pembrolizumab on 11/8/24. PET ct December 2024 12/23/24 PET/CT Compared with PET/CT 10/16/2024, there has been interval response to therapy with residual FDG avid disease evident  12/21/25 MRI Brain Final read pending however on personal review of imaging, there is improvement in size and ring rim enhancement compatible with interval response to therapy.  He denies any seizure episodes or headaches, his wife reports he still has some episodes of subtle confusion, he reports weakness in the left lower extremity and falls twice since surgery.  VISIT DATED: 5/7/2025 Mr. Tomlin presents for routine f/u and progress check with completed cranial images for review. In the interim he underwent tumor debulking ABAD and cryo biopsy with Dr. Portillo on 4/23/2025 PATH favoring non-small cell carcinoma, adenocarcinoma. Post discharge CXR noted for pneumothorax. CT chest 4/25/2025 IMPRESSION: Moderate left pneumothorax with partial atelectasis of the left upper lobe. Patient was asymptomatic and discharged home. Then on 5/1/2025 patient BIBA after found down by wife with convulsions now on Vimpat 200mg BID with f/u scheduled with Dr. Frankel on 5/19/2025. Does recall  being on AED post op   Continues to follow with Dr. Roman now on second line Docetaxel and Ramucirumab started on 3/14/25 after PETct on 3/9/2025 was noted for POD. Recent PETct on 4/2/2025 with stable findings.  Today doing overall well. Does note being a little slow in his responses. Denies N+V, Headaches/unilateral extremity weakness/memory changes/gait disturbance/bowel/bladder dysfunction or other neurologic symptoms.   MRI brain w w/o contrast 5/6/2025 IMPRESSION: No new abnormal enhancement or signal to suggest recurrence of disease or new disease.

## 2025-05-07 NOTE — REVIEW OF SYSTEMS
[Fever] : no fever [Chills] : no chills [Night Sweats] : no night sweats [Dizziness] : no dizziness [Fainting] : no fainting [Difficulty Walking] : no difficulty walking [FreeTextEntry2] : 10 to 15 pounds weight loss [Fatigue] : no fatigue [FreeTextEntry3] : bilateral eye tearing, does need reading glasses [de-identified] : slight confusion or memory, gait a little unstable

## 2025-05-07 NOTE — PHYSICAL EXAM
[General Appearance - Alert] : alert [] : no respiratory distress [de-identified] : EXAM LIMITED d/t visit TELEHEALTH

## 2025-05-07 NOTE — PHYSICAL EXAM
[General Appearance - Alert] : alert [] : no respiratory distress [de-identified] : EXAM LIMITED d/t visit TELEHEALTH

## 2025-05-07 NOTE — REVIEW OF SYSTEMS
[Fever] : no fever [Chills] : no chills [Night Sweats] : no night sweats [Dizziness] : no dizziness [Fainting] : no fainting [Difficulty Walking] : no difficulty walking [FreeTextEntry2] : 10 to 15 pounds weight loss [Fatigue] : no fatigue [FreeTextEntry3] : bilateral eye tearing, does need reading glasses [de-identified] : slight confusion or memory, gait a little unstable

## 2025-05-07 NOTE — REVIEW OF SYSTEMS
[Fever] : no fever [Chills] : no chills [Night Sweats] : no night sweats [Dizziness] : no dizziness [Fainting] : no fainting [Difficulty Walking] : no difficulty walking [FreeTextEntry2] : 10 to 15 pounds weight loss [Fatigue] : no fatigue [FreeTextEntry3] : bilateral eye tearing, does need reading glasses [de-identified] : slight confusion or memory, gait a little unstable

## 2025-05-07 NOTE — HISTORY OF PRESENT ILLNESS
[Home] : at home, [unfilled] , at the time of the visit. [Medical Office: ([facility])___] : at the medical office located in  [Telehealth (audio & video)] : This visit was provided via telehealth using real-time 2-way audio visual technology. [Verbal consent obtained from patient] : the patient, [unfilled] [FreeTextEntry1] : Rt hx: GKRS to LEFT frontal surgical cavity 24Gy over 3Fxs 10/29-10/31/2024  INITIAL CONSULTATION: OCTOBER 17, 2024 This is a 63 y/o M with a h/o HTN, DM2 who Presents for consultation to discuss the possible role of radiation therapy in his care.   The course of illness began when he presented to [facility] with altered mental status in September 2024. Reports he was having difficulty participating in work related activities thus he stopped going, also with decreased appetite and was staying in bed most days. Family also noted he was not his usual self.  During workup in the ER [facility] on 9/28/2024 appreciated an INTRA-AXIAL: 6.1 x 5.0 x 3.8 cm mostly hypoattenuating mass lesion in the left anterior frontal lobe crossing the anterior falx into the medial right temporal lobe with surrounding vasogenic edema. There is severe mass effect on the surrounding parenchyma and on the ventricular system with marked left-to-right midline shift of 1.6 cm. Metastatic workup was completed to include CT chest/abdomen on said day IMPRESSION: Large left lung mass concerning for primary lung neoplasm.1.3 cm gastrohepatic lymph node, and 1.4 cm lytic lesion in the left iliac bone concerning for distal metastasis.  MRI brain w w/o contrast 9/30/2024IMPRESSION:6.5 x 5.1 x 4.8 cm (maximal AP x TV x CC dimensions) peripherally enhancing, centrally necrotic mass within the left anterior frontal lobe demonstrating regions of restricted diffusion. Findings are suspicious for the presence of glioblastoma multiform. Differential diagnosis includes metastatic disease. Rightward midline shift of 1 cm. Rightward subfalcine herniation of 1.6 cm.  Patient was admitted for treatment and ultimately underwent a Left frontotemporal craniotomy and excision of tumor with MR stereotactic and ultrasonic guidance with Dr. Ty Reyes on 10/1/2024 PATH: Left frontal tumor on dura, biopsy Metastatic non-small cell carcinoma, compatible with adenocarcinoma of lung origin   MRI brain w w/o contrast 10/2/2024 IMPRESSION: Postsurgical changes in the left frontal region status post left frontal craniotomy and mass resection. Left frontal vasogenic edema with mass effect on the left lateral ventricle and approximately 1.0 cm of rightward midline shift similar to presurgical exam. Focal plaque like region of enhancement in the posterior aspect of the resection cavity possibly representing residual enhancing tumor versus postoperative changes. Recommend continued follow-up.  VISIT DATED: 10/17/2024 Patient presents for consult with Dr. Robb. Pt reports that he has lost 10 to 15 pounds since diagnosis. He does not sleep at night and doesn't have much pain. Spouse reports that he has unstable gait but the pt denies it. Pt used to be a smoker before the surgery smoking cigarettes for a couple years and switching over to cigars. Pt stiches are intact no redness or swelling surrounding the area. Pt wants to remain positive during this of treatment. Reports  Patient scheduled with Dr. Josh Roman (heme/onco) 10/18/2024  VISIT DATED: 1/23/2025 Mr. Tomlin presents for progress check and evaluation he is s/p GKRS to LEFT frontal met 24Gy over 3Fxs 10/29-10/31/2024  1/21/2025 MRI Brain Postsurgical changes in the left frontal region status post left frontal craniotomy and mass resection. Left frontal vasogenic edema with mass effect on the left lateral ventricle and approximately 1.0 cm of rightward midline shift similar to presurgical exam. Focal plaque like region of enhancement in the posterior aspect of the resection cavity possibly representing residual enhancing tumor versus postoperative changes. Recommend continued follow-up.  Follows with Dr. Roman and started first-line systemic therapy with combination of chemotherapy and immunotherapy with carboplatin, pemetrexed, and pembrolizumab on 11/8/24. PET ct December 2024 12/23/24 PET/CT Compared with PET/CT 10/16/2024, there has been interval response to therapy with residual FDG avid disease evident  12/21/25 MRI Brain Final read pending however on personal review of imaging, there is improvement in size and ring rim enhancement compatible with interval response to therapy.  He denies any seizure episodes or headaches, his wife reports he still has some episodes of subtle confusion, he reports weakness in the left lower extremity and falls twice since surgery.  VISIT DATED: 5/7/2025 Mr. Tomlin presents for routine f/u and progress check with completed cranial images for review. In the interim he underwent tumor debulking ABAD and cryo biopsy with Dr. Portillo on 4/23/2025 PATH favoring non-small cell carcinoma, adenocarcinoma. Post discharge CXR noted for pneumothorax. CT chest 4/25/2025 IMPRESSION: Moderate left pneumothorax with partial atelectasis of the left upper lobe. Patient was asymptomatic and discharged home. Then on 5/1/2025 patient BIBA after found down by wife with convulsions now on Vimpat 200mg BID with f/u scheduled with Dr. Frankel on 5/19/2025. Does recall  being on AED post op   Continues to follow with Dr. Roman now on second line Docetaxel and Ramucirumab started on 3/14/25 after PETct on 3/9/2025 was noted for POD. Recent PETct on 4/2/2025 with stable findings.  Today doing overall well. Does note being a little slow in his responses. Denies N+V, Headaches/unilateral extremity weakness/memory changes/gait disturbance/bowel/bladder dysfunction or other neurologic symptoms.   MRI brain w w/o contrast 5/6/2025 IMPRESSION: No new abnormal enhancement or signal to suggest recurrence of disease or new disease.

## 2025-05-08 NOTE — HISTORY OF PRESENT ILLNESS
[Post-hospitalization from ___ Hospital] : Post-hospitalization from [unfilled] Hospital [Admitted on: ___] : The patient was admitted on [unfilled] [Discharged on ___] : discharged on [unfilled] [Discharge Summary] : discharge summary [Pertinent Labs] : pertinent labs [Radiology Findings] : radiology findings [Discharge Med List] : discharge medication list [Med Reconciliation] : medication reconciliation has been completed [FreeTextEntry8] : 63 M with T2DM, HTN, NSCLC, left frontal brain tumor excision,  Metastatic non-small cell carcinoma compatible with adenocarcinoma of lung origin, Mr. Tomlin is on second line systemic therapy with docetaxel and ramucirumab started 3/14/25, for metastatic non-small cell lung adenocarcinoma with resected brain metastasis s/p GK-SRS in late Oct 2024. He is here for mid-cycle evaluation after receiving cycle 2 docetaxel and ramucirumab on 4/4/25. He is planned for bronchoscopy, tumor debulking, and airway dilation on 4/23 with Dr. Renner [FreeTextEntry2] : 63 M with T2DM, HTN, left frontal brain tumor excision complicated by seizures, metastatic non-small cell carcinoma compatible with adenocarcinoma of lung origin, currently on second line systemic therapy with docetaxel and ramucirumab (started 3/14/25) s/p GK-SRS in late Oct 2024. He is currently following with neurosurgery, pulmonary medicine as well as hematology oncology.  He feels well today.  Discharge summary was reviewed including medication list and he is now currently taking lacosamide 200 mg daily and amlodipine 10 mg daily.

## 2025-05-08 NOTE — PHYSICAL EXAM
[Restricted in physically strenuous activity but ambulatory and able to carry out work of a light or sedentary nature] : Status 1- Restricted in physically strenuous activity but ambulatory and able to carry out work of a light or sedentary nature, e.g., light house work, office work [Normal] : affect appropriate [de-identified] : Soft NT/ND No masses [de-identified] : Right inner forearm with erythema, warmth, and scant papular rash on areas of erythema. No skin necrosis. Skin is intact.  [Ambulatory and capable of all self care but unable to carry out any work activities] : Status 2- Ambulatory and capable of all self care but unable to carry out any work activities. Up and about more than 50% of waking hours [de-identified] : No icterus.  [de-identified] : MMM O/P Clear [de-identified] : Supple No LAD [de-identified] : Somewhat distant BS [de-identified] : S1 S2 [de-identified] : Trace LE edema  [de-identified] : No spine/CVA tenderness  [de-identified] : Ambulatory.

## 2025-05-08 NOTE — HEALTH RISK ASSESSMENT
[No] : In the past 12 months have you used drugs other than those required for medical reasons? No [One fall no injury in past year] : Patient reported one fall in the past year without injury [0] : 2) Feeling down, depressed, or hopeless: Not at all (0) [PHQ-2 Negative - No further assessment needed] : PHQ-2 Negative - No further assessment needed [Former] : Former [20 or more] : 20 or more [< 15 Years] : < 15 Years [de-identified] : seizure [de-identified] : Neuro, Radiation Oncology, Hematology, PULM [de-identified] : Healthy [de-identified] : pt is not active currently  [[id number]] : 0 [de-identified] : pt smoker for 40 plus years, 1 pack a day

## 2025-05-08 NOTE — HISTORY OF PRESENT ILLNESS
[Disease: _____________________] : Disease: [unfilled] [AJCC Stage: ____] : AJCC Stage: [unfilled] [de-identified] : Mr. Tomlin is a 63-year-old man, non-smoker, with PMH of HTN and DM2 who presented to [facility] 9/26/24 with decreased intake and altered mental status x 1 week. CT head followed by brain MRI showed a 6.5 x 5.1 x 4.8 cm peripherally enhancing, centrally necrotic mass in the left anterior frontal lobe, with rightward midline shift. 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 On 10/1/24 he underwent left craniotomy for tumor resection. Pathology of the excised left frontal brain tumor was interpreted as showing non-small cell carcinoma compatible with adenocarcinoma of lung origin. PDL1 TPS >95%.  Foundation negative for actionable mutations (+ BRAF G466A, NF1, TP53, among others) and TMB-High. Treated with GK-SRS to brain in late October 2024.  Started first line combination chemotherapy and immunotherapy with Carboplatin/Pemetrexed and Pembrolizumab 11/8/24.  Achieved NE.  Treated with 4 cycles of triplet therapy through early January 2025 followed by pemetrexed/pembrolizumab maintenance as of late January 2025.  Treated with maintenance therapy through February 2025.  Developed disease progression in March 2025 and started second line docetaxel/ramucirumab on 3/14/25. Case discussed at interdisciplinary tumor board with recommendation for bronchoscopy with IP due to tumor narrowing of the airway, and possible palliative thoracic RT afterwards. Achieved NE to 2nd line therapy.    [de-identified] : - Left frontal brain tumor excision: 10/1/2024: Metastatic non-small cell carcinoma compatible with adenocarcinoma of lung origin. PD-L1 high-positive > 95%.  Foundation:  + BRAF G466A, NF1, TP53, among others.  TMB-High.  [de-identified] : Mr. Tomlin is on second line systemic therapy with docetaxel and ramucirumab started 3/14/25, for metastatic non-small cell lung adenocarcinoma with resected brain metastasis s/p GK-SRS in late Oct 2024.  Patient is status post C2 of docetaxel/ramucirumab on 4//25.  C3 plan for 4/25 was held secondary to the patient undergoing a bronchoscopy where he had debulking of ABAD endobronchial tumor. Patient is status post hospitalization 5/1-5//25 after experiencing a seizure.  Patient was monitored in the epilepsy unit and medications were adjusted with addition of lacosamide.  Patient presents today with his wife for follow-up.  He continues on the seizure medications.  No further seizures. He reports fatigue. Otherwise reports tolerating docetaxel/ramucirumab fairly well.  He does have dyspnea.  He is deconditioned status post hospital stay. Denies F/C/N/V.  Restaging PET/CT/29/25 with response to interval therapy status post 2 cycles. Went for restaging brain MRI today with results pending.

## 2025-05-08 NOTE — PHYSICAL EXAM
[Normal] : normal gait, coordination grossly intact, no focal deficits and deep tendon reflexes were 2+ and symmetric [80355 - High Complexity requires an extensive number of possible diagnoses and/or the management options, extensive complexity of the medical data (tests, etc.) to be reviewed, and a high risk of significant complications, morbidity, and/or mortality as w] : High Complexity

## 2025-05-08 NOTE — PLAN
[FreeTextEntry1] : A1c, B12, folate, lipid panel, vitamin D blood tests ordered today. Patient to follow up with Neurosurgery, neurology, radiation oncology, pulmonary medicine and hematology oncology.

## 2025-05-08 NOTE — RESULTS/DATA
[FreeTextEntry1] : - CT C/A/P with contrast 9/26/24: Large left lung mass concerning for primary lung neoplasm. 1.3 cm gastrohepatic lymph node, and 1.4cm lytic lesion in the left iliac bone concerning for distal metastasis.   - CT Head without contrast: 9/26/24: 6.1 x 5 x 3.8 cm mostly hypoattenuating mass lesion in the left anterior frontal lobe, crossing the anterior falx into the medial right temporal lobe with surrounding vasogenic edema. There is severe mass effect on the surrounding parenchyma and on the ventricular system with marked left-to-right midline shift of 1.6cm. Recommend contrast-enhanced MRI brain for further characterization.   - MRI brain 9/30/24: 6.5 x 5.1 x 4.8 cm peripherally enhancing, centrally necrotic mass within the left anterior frontal lobe demonstrating regions of restricted diffusion. Findings are suspicious for the presence of glioblastoma multiform. Differential diagnosis includes metastatic disease.  Right midline shift of 1cm. Rightward subfalcine herniation of 1.6cm. Basal cisterns are visualized. Mild asymmetric dilation of the right lateral ventricle.   - Surgical Pathology 10/1/24: 1. Left frontal tumor on dura, biopsy: Metastatic non-small cell carcinoma, compatible with adenocarcinoma of lung origin. 2. Left frontal tumor on dura, biopsy: Metastatic non-small cell carcinoma, compatible with adenocarcinoma of lung origin. 3. Left frontal brain tumor on dura, excision: Metastatic non-small cell carcinoma, compatible with adenocarcinoma of lung origin.  Note: Sections show a high-grade malignant neoplasm with predominantly solid growth and scattered foci of necrosis.  IHC: neoplastic cells are diffusely positive for high-molecular weight (HMW), cytokeratin, CK7, TTF-1, and p63, and are negative for CK20, GATA3, p40, NKX3.1, and PAAX8. GFAP highlights background brain parenchyma.  PDL1 TPS >95%.   - Cytology 10/1/24: Brain, left, frontal, fluid: Positive for malignant cells. Carcinoma.   - CT head without contrast 10/2/24: Postoperative changes compatible with left frontal craniotomy. Some low attenuation identified in the postop region with some associated air as well as subtle high attenuation. this area of high changes compatible areas of hemorrhage on postop material. Surrounding edema is again seen. Decreased mass effect on the anteior aspect of both lateral ventricle is seen. Residual right left shift is seen (1.1cm).   -MR Brain 10/2/24: Postsurgical changes in the left frontal region status post left frontal craniotomy and mass resection.  Left frontal vasogenic edema with mass effect on the left lateral ventricle and approximately 1cm of rightward midline shift similar to presurgical exam.  Focal plaque like region of enhancement in the posterior aspect of the resection cavity possibly representing residual enhancing tumor versus postoperative changes. Recommend continued follow-up.   - PET/CT 10/16/24:  1. Large peripherally FDG-avid, centrally photopenic multiloculated mass in the left perihilar/paramediastinal left upper lobe corresponds to known malignancy. Separate FDG-avid left upper lobe nodule is compatible with metastasis.  2. FDG-avid left perihilar, bilateral mediastinal, bilateral cervical, and distal right external iliac lymph nodes. The left perihilar and AP window lymph nodes are compatible with metastatic disease. Small FDG-avid bilateral mediastinal/cervical and right external iliac lymph nodes are indeterminate. Differential diagnosis includes inflammatory and/or neoplastic etiologies. Cervical/supraclavicular lymph nodes are accessible to an ultrasound-guided percutaneous needle biopsy.  3. Enlarged prostate gland with right greater than left hypermetabolism. Differential diagnosis includes infectious/inflammatory etiologies and prostate carcinoma. Urology consultation is recommended.  4. Nonspecific FDG-avid thickening of upper esophagus and nonspecific hypermetabolism of fundus and body of stomach, most intense along the lesser curvature, are indeterminate. Endoscopy is recommended to exclude neoplasm.  5. Mild, diffuse hypermetabolism in nonenlarged thyroid gland suggests thyroiditis. Please correlate clinically.  -PET/CT 12/23/24:   1. Compared with PET/CT 10/16/2024, there has been interval response to therapy with residual FDG avid disease evident. 2. Resolution of FDG avid cervical lymph nodes. 3. Decrease in number of FDG avid lymph nodes in the mediastinum and hilum. Small lymph nodes in the AP window remain evident, with mild residual FDG uptake. 4. Decrease in size of a left perihilar mass and left upper lobe mass, with decrease in extent of FDG avidity and with focal areas of increasing FDG avidity when compared to prior exam. 5. Similar size and mildly decreased activity in an apical left upper lobe nodule. 6. Decrease in size and FDG uptake in the prostate, possibly due to resolving infection. Decrease in FDG avidity of a right external iliac lymph node, which may have been reactive. Recommend urology consultation if not previously performed. 7. Resolution of FDG avidity in a thickened upper esophagus. Increase in nonspecific hypermetabolism in the stomach. Recommend correlation with endoscopy. 8. Similar mild diffuse hypermetabolism in the thyroid. This can be correlated with TSH.  -MRI Brain 1/21/25:  No findings suspicious for tumor progression. Indeterminate 6 mm focus of nodular enhancement at the posterior margin of the left frontal resection bed. Attention on follow-up exams is advised. No findings suspicious for new sites of metastatic disease.  -PET/CT 3/9/25:  Compared to FDG-PET/CT scan dated 12/23/2024:  1. FDG-avid partially necrotic left upper lobe mass and ipsilateral mediastinal/hilar lymphadenopathy, increased as compared to prior study, with associated increased left upper lobe atelectasis. Findings are compatible with progression of disease. Previously noted FDG-avid left apical nodule is not delineated within the atelectatic left upper lobe/lymphadenopathy.  2. Nonspecific gastric hypermetabolism is decreased.  3. Nonspecific, mild FDG avidity and nonenlarged prostate gland, similar in metabolism and less extensive.  - Bronchoscopy 4/23/2025: Left lung with endobronchial tumor from the ABAD; normal airway exam otherwise.  Underwent tumor debulking of ABAD endobronchial tumor. Lung, ABAD biopsy 4/23/2025: Positive for malignant cells.  Non-small cell carcinoma, favor adenocarcinoma.  Images Reviewed/Interpreted:  - CT Chest 4/25/25: Moderate left pneumothorax with partial atelectasis of the left upper lobe.  -PET/CT 4/29/25:  Compared to FDG-PET/CT scan dated 3/9/2025: 1. Partially FDG-avid left hilar lymphadenopathy/left upper lobe mass is similar in size as compared to CT dated 4/25/2025, and decreased in size and is similar in metabolism as compared to prior PET/CT. There is increased aeration of the left upper lobe as compared to 4/25/2025 and prior PET/CT. A small left apical pneumothorax, new since prior PET/CT, is decreased since 4/25/2025. Within the left upper lobe there is a small FDG-avid nodular opacity which is not definitely seen on 4/25/2025. 2. A small left pleural effusion is mildly increased since 4/25/2025. 3. Nonspecific diffuse gastric hypermetabolism, mildly increased, may reflect gastritis. 4. Nonspecific, mild FDG avidity and nonenlarged prostate gland, similar to prior study. 5. Possible paralyzed left vocal cord. Correlate clinically for hoarseness and with direct visualization, if indicated.  -CT Brain 5/1/25:  Encephalomalacia and gliosis left frontal lobe with resolution of vasogenic edema and mass effect since 10/2/2024.  -LE Dopplers 5/1/25:  No evidence of deep venous thrombosis in either lower extremity.  - Inpatient labs 5/3/2025: WBC 7.0 hemoglobin 12.2 platelets 237K.  Serum creatinine 1.1.  LFTs normal.

## 2025-05-08 NOTE — PHYSICAL EXAM
[Restricted in physically strenuous activity but ambulatory and able to carry out work of a light or sedentary nature] : Status 1- Restricted in physically strenuous activity but ambulatory and able to carry out work of a light or sedentary nature, e.g., light house work, office work [Normal] : affect appropriate [de-identified] : Soft NT/ND No masses [de-identified] : Right inner forearm with erythema, warmth, and scant papular rash on areas of erythema. No skin necrosis. Skin is intact.  [Ambulatory and capable of all self care but unable to carry out any work activities] : Status 2- Ambulatory and capable of all self care but unable to carry out any work activities. Up and about more than 50% of waking hours [de-identified] : No icterus.  [de-identified] : MMM O/P Clear [de-identified] : Supple No LAD [de-identified] : Somewhat distant BS [de-identified] : S1 S2 [de-identified] : Trace LE edema  [de-identified] : No spine/CVA tenderness  [de-identified] : Ambulatory.

## 2025-05-08 NOTE — HEALTH RISK ASSESSMENT
[No] : In the past 12 months have you used drugs other than those required for medical reasons? No [One fall no injury in past year] : Patient reported one fall in the past year without injury [0] : 2) Feeling down, depressed, or hopeless: Not at all (0) [PHQ-2 Negative - No further assessment needed] : PHQ-2 Negative - No further assessment needed [Former] : Former [20 or more] : 20 or more [< 15 Years] : < 15 Years [de-identified] : seizure [de-identified] : Neuro, Radiation Oncology, Hematology, PULM [de-identified] : Healthy [de-identified] : pt is not active currently  [[id number]] : 0 [de-identified] : pt smoker for 40 plus years, 1 pack a day

## 2025-05-08 NOTE — ASSESSMENT
[FreeTextEntry1] : 63M former smoker with Stage IV/metastatic NSCLC with adenocarcinoma histology with a large ABAD primary tumor with evidence of metastatic disease involving the intrathoracic lymph nodes, cervical lymph nodes and brain.    Patient is status post craniotomy resection of a large left frontal brain tumor metastasis October 2024.  Tumor tested PD-L1 high-positive and Foundation negative for actionable mutations (+ BRAF G466A, NF1, TP53, among others) and TMB-High. Received post-op GK-SRS in late-October 2024. He started first-line systemic therapy with combination of chemotherapy and immunotherapy with carboplatin, pemetrexed, and pembrolizumab on 11/8/24; achieved nice VT. Treated with 4 cycles of triplet therapy through early January 2025 followed by pemetrexed/pembrolizumab maintenance as of late January 2025.  Treated with maintenance therapy through February 2025.  Developed disease progression in March 2025.  Started second line docetaxel and ramucirumab on 3/14/25. Patient is status post C2 of docetaxel/ramucirumab on 4//25.  C3 plan for 4/25 was held secondary to the patient undergoing a bronchoscopy where he had debulking of ABAD endobronchial tumor. Patient is status post hospitalization 5/1-5//25 after experiencing a seizure.  Patient was monitored in the epilepsy unit and medications were adjusted with addition of lacosamide. Achieved VT to Docetaxel/Ramucirumab.  Restaging PET/CT 4/29/25 with response to interval therapy.  Recommend: - Continue second line treatment with Docetaxel 60mg/m2 and Ramucirumab 7.5mg/kg IV every 3 weeks.  For cycle 3 on 5/16.  Avoiding earlier treatment to allow recovery from recent hospitalization.  - Potential treatment with nab-paclitaxel discussed should that become necessary.   - Recent inpatient labs noted.  - Perioidic UA while on ramucirumab.  - Continue dexamethasone in the stacey-chemo setting the day before and the day after chemo.  - Patient currently declining Mediport placement.  - Anemia. Secondary to chemo.  Monitor hemoglobin. - Elevated blood pressure. Continue to monitor for need for antihypertensives while on ramucirumab. May need to follow up with PMD. - F/U with IP for periodic bronchoscopies given ABAD endobronchial tumor  - Continue follow up with Dr. Reyes and Dr. Robb, s/p craniotomy resection of large frontal tumor 10/1/24 and s/p GK-SRS completed 10/31/24. F/U Brain MRI from 5/6/25.  - There was an apparent lytic lesion of the left iliac bone noted on inpatient CT scan, however, this was not noted to be hypermetabolic on his initial PET CT scan.  Would avoid the addition of a bone modifying agent at this juncture - PET CT scan noted non-FDG avid thickening of the upper esophagus and nonspecific avidity of the stomach which were felt to be indeterminant. Previously recommended GI evaluation for EGD.  Can pursue this down the line.  - Patient declined psycho-onc referral.  -F/U with Neuro for management of seizures.  Now on Lacosamide.  - Follow-up following next treatment mid-cycle     -Can plan on obtaining his next restaging scan in July prior to Late July MD visit

## 2025-05-08 NOTE — HISTORY OF PRESENT ILLNESS
[Disease: _____________________] : Disease: [unfilled] [AJCC Stage: ____] : AJCC Stage: [unfilled] [de-identified] : Mr. Tomlin is a 63-year-old man, non-smoker, with PMH of HTN and DM2 who presented to [facility]/26/24 with decreased intake and altered mental status x 1 week. CT head followed by brain MRI showed a 6.5 x 5.1 x 4.8 cm peripherally enhancing, centrally necrotic mass in the left anterior frontal lobe, with rightward midline shift. 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 On 10/1/24 he underwent left craniotomy for tumor resection. Pathology of the excised left frontal brain tumor was interpreted as showing non-small cell carcinoma compatible with adenocarcinoma of lung origin. PDL1 TPS >95%.  Foundation negative for actionable mutations (+ BRAF G466A, NF1, TP53, among others) and TMB-High. Treated with GK-SRS to brain in late October 2024.  Started first line combination chemotherapy and immunotherapy with Carboplatin/Pemetrexed and Pembrolizumab 11/8/24.  Achieved FL.  Treated with 4 cycles of triplet therapy through early January 2025 followed by pemetrexed/pembrolizumab maintenance as of late January 2025.  Treated with maintenance therapy through February 2025.  Developed disease progression in March 2025 and started second line docetaxel/ramucirumab on 3/14/25. Case discussed at interdisciplinary tumor board with recommendation for bronchoscopy with IP due to tumor narrowing of the airway, and possible palliative thoracic RT afterwards. Achieved FL to 2nd line therapy.    [de-identified] : - Left frontal brain tumor excision: 10/1/2024: Metastatic non-small cell carcinoma compatible with adenocarcinoma of lung origin. PD-L1 high-positive > 95%.  Foundation:  + BRAF G466A, NF1, TP53, among others.  TMB-High.  [de-identified] : Mr. Tomlin is on second line systemic therapy with docetaxel and ramucirumab started 3/14/25, for metastatic non-small cell lung adenocarcinoma with resected brain metastasis s/p GK-SRS in late Oct 2024.  Patient is status post C2 of docetaxel/ramucirumab on 4//25.  C3 plan for 4/25 was held secondary to the patient undergoing a bronchoscopy where he had debulking of ABAD endobronchial tumor. Patient is status post hospitalization 5/1-5//25 after experiencing a seizure.  Patient was monitored in the epilepsy unit and medications were adjusted with addition of lacosamide.  Patient presents today with his wife for follow-up.  He continues on the seizure medications.  No further seizures. He reports fatigue. Otherwise reports tolerating docetaxel/ramucirumab fairly well.  He does have dyspnea.  He is deconditioned status post hospital stay. Denies F/C/N/V.  Restaging PET/CT/29/25 with response to interval therapy status post 2 cycles. Went for restaging brain MRI today with results pending.

## 2025-05-09 NOTE — RESULTS/DATA
[FreeTextEntry1] : EXAM: [id number] - MR BRAIN WAW IC - ORDERED BY: LISA MELENDEZ   PROCEDURE DATE: 05/06/2025    INTERPRETATION: Exam Date: 5/6/2025 12:46 PM  MR brain with and without gadolinium  CLINICAL INFORMATION: Surveillance imaging for patient with metastatic brain disease from lung primary;  TECHNIQUE: Multiplanar imaging of the brain was performed pre- and post-IV contrast. 8 cc of Gadavist was administered. 2 cc was discarded.  COMPARISON: 5/1/2025 and MRI brain 1/21/2025  FINDINGS:  Reidentified is left frontotemporal craniotomy for prior resection of tumor. Underlying surgical cavity with thin peripheral enhancement and associated old hemorrhagic blood products are reidentified without change. Previously reported subcentimeter nodular focus of enhancement along the posterior margin of the resection bed appears slightly smaller than on prior exam. No new areas of abnormal enhancement. Surrounding T2/FLAIR hyperintense signal appears unchanged.  There is a nonenhancing oblong shaped focus of T2/FLAIR hyperintense signal in the right lateral aspect of the superior vanna (series 7, image 12 and series 8 image 60), unchanged since all prior exams, dating back to 9/30/2024, nonspecific, likely reflecting a very small chronic infarct.  Scattered small foci of T2/FLAIR hyperintense signal in the periventricular and subcortical white matter of the bilateral cerebri, likely reflecting mild chronic microvascular ischemic changes. Partially empty sella. No hydrocephalus. Normal course and caliber of the major intracranial flow voids. Similar prominent left lateral posterior fossa extra-axial space. No foci of parenchymal restricted diffusion. No findings suspicious for an acute intracranial hemorrhage.  Minimal mucosal thickening of the maxillary sinuses and of multiple ethmoid air cells. Orbits are unremarkable.  IMPRESSION:  No new abnormal enhancement or signal to suggest recurrence of disease or new disease.

## 2025-05-09 NOTE — ASSESSMENT
[FreeTextEntry1] : IMPRESSION  Pt is s/p 10/1/24 Left frontal craniotomy resection of metastatic NSCLC Path: adenocarcinoma compatible with adenocarcinoma of lung origin.  Following with Dr. Robb for NSLC stage 4 currently in chemo treatment.   MRI on 1/21/25 shows significant decrease in size of left frontal tumor cavity with mild posterior enhancement of uncertain nature.  Pt had a bronchoscopy for a lung lesion on 4/23/25. He had a seizure on 5/1/2025 and now on Tab Lacosamide 200mg BID now, will be following up with Dr. Bojorquez. Pt is doing well, ambulating with cane.  MRI on 5/6/25 showed smaller left frontal cavity, no change in enhancement and with no evidence of recurrence or new tumor  Plan:  MRI as planned by Dr. Robb in August Return in 3 months. Sz precautions and preventative measures discussed.

## 2025-05-09 NOTE — HISTORY OF PRESENT ILLNESS
[FreeTextEntry1] : Pt is s/p resection of a large left frontal NSLC (pt has left lung mass) on Oct 1. He had initial speech affectation and confusion. Seen by Oncology who is starting Ketruda and possible chemo for NSLC. Pt saw Dr. Robb last Thursday who will arrange SRS, hypo fractionated, for tumor region Pt states he is not feeling too bad. Every once in a while he gets a twinge in the surgical area. Pt speech is not to bad. Pt feels a little slower than before. No headaches per se except for the twinges. Pt is off the steroid medication. Pt is still on Keppra 500 mg bid. Pt is going to get outpatient OT and PT. He is doing well right now with few symptoms. MRI on 1/21/25 shows significant decrease in size of left frontal tumor cavity with mild posterior enhancement of uncertain nature. Pt s under care of Dr. Robb and Dr. Roman.   Pt reports that he was in the hospital with seizure on 5/1/2025, he was admitted in the hospital and had EEG was performed. He was placed on Lacosamide and was referred to Dr. Bojorquez. Pt was also had a bronchoscopy and tumor debulking done on 4/23/25. Pt currently recovering at home, taking antiseizure medication he feels tired and slow. Pt otherwise with no symptoms. Pt has to start his chemo with Dr. Roman on 5/16/25. Pt had seen Dr. Robb recently and  discussed MRI with him. Pt ambulating with walker, has not restarted therapy yet.

## 2025-05-09 NOTE — PHYSICAL EXAM
[General Appearance - Alert] : alert [General Appearance - In No Acute Distress] : in no acute distress [General Appearance - Well Nourished] : well nourished [General Appearance - Well-Appearing] : healthy appearing [Oriented To Time, Place, And Person] : oriented to person, place, and time [Affect] : the affect was normal [Memory Recent] : recent memory was not impaired [Person] : oriented to person [Place] : oriented to place [Time] : oriented to time [Short Term Intact] : short term memory intact [Cranial Nerves Optic (II)] : visual acuity intact bilaterally,  pupils equal round and reactive to light [Cranial Nerves Oculomotor (III)] : extraocular motion intact [Cranial Nerves Trigeminal (V)] : facial sensation intact symmetrically [Cranial Nerves Facial (VII)] : face symmetrical [Cranial Nerves Vestibulocochlear (VIII)] : hearing was intact bilaterally [Cranial Nerves Accessory (XI - Cranial And Spinal)] : head turning and shoulder shrug symmetric [Cranial Nerves Hypoglossal (XII)] : there was no tongue deviation with protrusion [Sclera] : the sclera and conjunctiva were normal [PERRL With Normal Accommodation] : pupils were equal in size, round, reactive to light, with normal accommodation [Neck Appearance] : the appearance of the neck was normal [] : the neck was supple [Respiration, Rhythm And Depth] : normal respiratory rhythm and effort [Heart Rate And Rhythm] : heart rate was normal and rhythm regular [Abdomen Soft] : soft [No Spinal Tenderness] : no spinal tenderness [Involuntary Movements] : no involuntary movements were seen [FreeTextEntry8] : mild wobbliness at turns, gait labored and slowish when not using cane

## 2025-05-09 NOTE — REVIEW OF SYSTEMS
[Leg Weakness] : leg weakness [Seizures] : convulsions [Joint Pain] : joint pain [Negative] : Endocrine

## 2025-05-13 NOTE — HISTORY OF PRESENT ILLNESS
[Home] : at home, [unfilled] , at the time of the visit. [Medical Office: ([facility])___] : at the medical office located in  [Telehealth (audio & video)] : This visit was provided via telehealth using real-time 2-way audio visual technology. [Verbal consent obtained from patient] : the patient, [unfilled] [Spouse] : spouse [de-identified] : 63 M with T2DM, HTN, left frontal brain tumor excision complicated by seizures, metastatic non-small cell carcinoma currently on second line systemic therapy with docetaxel and ramucirumab presenting for a follow up visit. Recent blood work is significant for an elevated total cholesterol and LDL cholesterol.  He does not remember what his most recent lipid panel showed.  He never took statins either.

## 2025-05-19 NOTE — HISTORY OF PRESENT ILLNESS
[FreeTextEntry1] : *** 05/19/2025 ***  VIRGEN BERUMEN is here for follow up. stable, no seizures. no meds SE but not interested in any additional recommendation( like ST for memory complains) further history as below:  HPI: 64 yo with resection of left frontal lobe metastasis of adenocarcinoma of the lung in 10/2024, HTN, HLD reports to the hospital 2/2 LOC episode with convulsions onset today 5/1. Admission to EMU for concern for seizures/event capture. Patient states the last thing he remembered was getting dressed and then the next time he remembers was being in the ED. Wife states she heard some grunting from the room but no response, found the patient down on the floor, with head turned to the left and arms and legs james. Wife reports the episode lasted at elast 10-15 mins, did not stop until EMS arrived and gave Versed 10mg total. No tongue biting or urinary incontinence. No family history of seizure, no CNS infections. Family states he was due for chemotherapy soon but his oncologist said to stop it. Patient denies any medication allergies, patient is a former tobacco - 50 pack year, former daily 3 beers alcohol use, occasional marijuana use. Wife reports that patient had craniotomy on October 2024 by Ty Reyes, was on LEV 500mg bid, stopped in January/February but no seizure activity ever reported Patient also reports some chronic leg swelling, since starting the amlodipine.  Per Hem-Onc note on 4/15, 62yo M, non-smoker with HTN, DM2 with prior CTH and MRI showes 6.5x5.1x4.8 enchancing, centrally necrotic mass in left anterior frontal lobe, CT C/A/P showed to left lung mass with some times. Patient underwent left carintomy on 10/1/23, pathology confirmed adenocarcinoma mets. Patient currently treated with chemotherapy and immunotherapy with Carboplatin/Pemetrexed and Pembrolizaumba -> 4 cycles of triplet therapy. Patient had disease progression in March 2025, at second line docetaxel/ramucirumab. Patient pending Bronchoscopy with IP and palliative thoracic.  Hospital course: Patient was admitted to the epilepsy monitoring unit to assess the risk of further clinical events. Patient was placed on EEG for monitoring. EEG showed left frontal subclinical seizures that resolved with lacosamide 200 mg BID.

## 2025-05-19 NOTE — ASSESSMENT
[FreeTextEntry1] : VIRGEN BERUMEN is a 63 years old with symptomatic focal epilepsy. was on Keppra but unclear compliance. better control with lacosamide 200 bid memory complains, likely multifactorial  Plan: continue lacosamide 200 bid ST referral for memory decline  f/u in 4 months

## 2025-05-22 NOTE — ASSESSMENT
[FreeTextEntry1] : Mr. BERUMEN is a 63-year-old male with PMH of NSCLC, non-smoker, diabetes type 2. Patient was referred to interventional pulmonology by Dr. Roman for possible bronchoscopy due to concern of progression of disease and for periodic bronchoscopies given ABAD endobronchial tumor    Mr. BERUMEN underwent a post flexible bronchoscopy/TBBX/tumor debulking/airway dilation/EBUS/BAL on 04/23/2025. Patient tolerated procedure well. No issues post-procedure. No hemoptysis. Some cough and sore throat, no dyspnea or chest pain.   The results  were positive for malignant cells non-small cell carcinoma favor adenocarcinoma on ABAD cryobiopsy. -  BAL atypical findings- moderate cellularity composed of rare, atypical bronchial cells with enlarged nuclei containing prominent nucleoli. -Culture results were negative to date.    These results were discussed with the patient. The next step at this time is to follow up with___.   Patient was given the opportunity to ask questions and all questions were answered. Emotional support was provided.

## 2025-05-22 NOTE — REASON FOR VISIT
[Follow-Up] : a follow-up visit [Abnormal CXR/ Chest CT] : an abnormal CXR/ chest CT [TextBox_13] : Abel

## 2025-05-22 NOTE — DISCUSSION/SUMMARY
[FreeTextEntry1] : The patients most recent CT scan was reviewed by my independently and my interpretation is stated below: - left pleural effusion noted.

## 2025-05-22 NOTE — HISTORY OF PRESENT ILLNESS
[TextBox_4] : Interventional Pulmonology Consultation Note Follow Up Visit with IP:  May 22 2025 3:30PM   Mr. BERUMEN is a 63-year-old male with PMH of NSCLC, non-smoker, diabetes type 2. Patient was referred to interventional pulmonology by Dr. Roman for possible bronchoscopy due to concern of progression of disease and for periodic bronchoscopies given ABAD endobronchial tumor  Per patient record he was admitted to [place] 9/2020 for found to have central necrotic mass on the anterior left frontal lobe which was found after craniotomy to be non-small cell carcinoma, adenocarcinoma, PD-L1 greater than 95% with disease progression now on second line treatment.  Consulted by medical oncology for endobronchial disease  Initially found to have left anterior frontal lobe brain mass with midline shift and underwent left craniotomy diagnosing adenocarcinoma of the lung.  The time also noted to have a 4.8 cm left lung mass.  He was started on carboplatin prior pemetrexed pembrolizumab 11/8/2024 with disease control.  Was placed on pemetrexed and pembrolizumab maintenance in January 2025.  Unfortunately had progression of disease March 2025 and starting docetaxel/ramucirumab.  Mr. BERUMEN is following up with Interventional Pulmonology today status post flexible bronchoscopy/TBBX/tumor debulking/airway dilation/EBUS/BAL on 04/23/2025.  During procedure ABAD endobronchial tumor was noted from apicoposterior segment and extended to the LMSB with 90% narrowing due to endobronchial tumor ingrowth.  Results were positive for malignant cells non-small cell carcinoma favor adenocarcinoma on ABAD cryobiopsy.  BAL atypical findings- moderate cellularity composed of rare, atypical bronchial cells with enlarged nuclei containing prominent nucleoli.  Since procedure patient has been following up with oncology and is currently receiving second line treatment  Docetaxel 60mg/m2 and Ramucirumab 7.5mg/kg IV every 3 weeks.  Additionally he also completed a PET CT scan on 0/29/2025 which was notable for: - Partially FDG-avid left hilar lymphadenopathy/left upper lobe mass is similar in size as compared to CT dated 4/25/2025, and decreased in size and is similar in metabolism as compared to prior PET/CT. There is increased aeration of the left upper lobe as compared to 4/25/2025 and prior PET/CT. A small left apical pneumothorax, new since prior PET/CT, is decreased since 4/25/2025. Within the left upper lobe there is a small FDG-avid nodular opacity which is not definitely seen on 4/25/2025. - A small left pleural effusion is mildly increased since 4/25/2025. - Nonspecific diffuse gastric hypermetabolism, mildly increased, may reflect gastritis. - Nonspecific, mild FDG avidity and nonenlarged prostate gland, similar to prior study. - Possible paralyzed left vocal cord. Correlate clinically for hoarseness and with direct visualization, if indicated.  Today

## 2025-05-22 NOTE — HISTORY OF PRESENT ILLNESS
[TextBox_4] : Interventional Pulmonology Consultation Note Follow Up Visit with IP:  May 22 2025 3:30PM   Mr. BERUMEN is a 63-year-old male with PMH of NSCLC, non-smoker, diabetes type 2. Patient was referred to interventional pulmonology by Dr. Roman for possible bronchoscopy due to concern of progression of disease and for periodic bronchoscopies given ABAD endobronchial tumor  Per patient record he was admitted to [place] 9/2020 for found to have central necrotic mass on the anterior left frontal lobe which was found after craniotomy to be non-small cell carcinoma, adenocarcinoma, PD-L1 greater than 95% with disease progression now on second line treatment.  Consulted by medical oncology for endobronchial disease  Initially found to have left anterior frontal lobe brain mass with midline shift and underwent left craniotomy diagnosing adenocarcinoma of the lung.  The time also noted to have a 4.8 cm left lung mass.  He was started on carboplatin prior pemetrexed pembrolizumab 11/8/2024 with disease control.  Was placed on pemetrexed and pembrolizumab maintenance in January 2025.  Unfortunately had progression of disease March 2025 and starting docetaxel/ramucirumab.  Mr. BERUMEN is following up with Interventional Pulmonology today status post flexible bronchoscopy/TBBX/tumor debulking/airway dilation/EBUS/BAL on 04/23/2025.  During procedure ABAD endobronchial tumor was noted from apicoposterior segment and extended to the LMSB with 90% narrowing due to endobronchial tumor ingrowth.  Results were positive for malignant cells non-small cell carcinoma favor adenocarcinoma on ABAD cryobiopsy.  BAL atypical findings- moderate cellularity composed of rare, atypical bronchial cells with enlarged nuclei containing prominent nucleoli.  Since procedure patient has been following up with oncology and is currently receiving second line treatment  Docetaxel 60mg/m2 and Ramucirumab 7.5mg/kg IV every 3 weeks.  Additionally he also completed a PET CT scan on 0/29/2025 which was notable for: - Partially FDG-avid left hilar lymphadenopathy/left upper lobe mass is similar in size as compared to CT dated 4/25/2025, and decreased in size and is similar in metabolism as compared to prior PET/CT. There is increased aeration of the left upper lobe as compared to 4/25/2025 and prior PET/CT. A small left apical pneumothorax, new since prior PET/CT, is decreased since 4/25/2025. Within the left upper lobe there is a small FDG-avid nodular opacity which is not definitely seen on 4/25/2025. - A small left pleural effusion is mildly increased since 4/25/2025. - Nonspecific diffuse gastric hypermetabolism, mildly increased, may reflect gastritis. - Nonspecific, mild FDG avidity and nonenlarged prostate gland, similar to prior study. - Possible paralyzed left vocal cord. Correlate clinically for hoarseness and with direct visualization, if indicated.  Today

## 2025-05-22 NOTE — HISTORY OF PRESENT ILLNESS
[TextBox_4] : Interventional Pulmonology Consultation Note Follow Up Visit with IP:  May 22 2025 3:30PM   Mr. BERUMEN is a 63-year-old male with PMH of NSCLC, non-smoker, diabetes type 2. Patient was referred to interventional pulmonology by Dr. Roman for possible bronchoscopy due to concern of progression of disease and for periodic bronchoscopies given ABAD endobronchial tumor  Per patient record he was admitted to [facility] 9/2020 for found to have central necrotic mass on the anterior left frontal lobe which was found after craniotomy to be non-small cell carcinoma, adenocarcinoma, PD-L1 greater than 95% with disease progression now on second line treatment.  Consulted by medical oncology for endobronchial disease  Initially found to have left anterior frontal lobe brain mass with midline shift and underwent left craniotomy diagnosing adenocarcinoma of the lung.  The time also noted to have a 4.8 cm left lung mass.  He was started on carboplatin prior pemetrexed pembrolizumab 11/8/2024 with disease control.  Was placed on pemetrexed and pembrolizumab maintenance in January 2025.  Unfortunately had progression of disease March 2025 and starting docetaxel/ramucirumab.  Mr. BERUMEN is following up with Interventional Pulmonology today status post flexible bronchoscopy/TBBX/tumor debulking/airway dilation/EBUS/BAL on 04/23/2025.  During procedure ABAD endobronchial tumor was noted from apicoposterior segment and extended to the LMSB with 90% narrowing due to endobronchial tumor ingrowth.  Results were positive for malignant cells non-small cell carcinoma favor adenocarcinoma on ABAD cryobiopsy.  BAL atypical findings- moderate cellularity composed of rare, atypical bronchial cells with enlarged nuclei containing prominent nucleoli.  Since procedure patient has been following up with oncology and is currently receiving second line treatment  Docetaxel 60mg/m2 and Ramucirumab 7.5mg/kg IV every 3 weeks.  Additionally he also completed a PET CT scan on 0/29/2025 which was notable for: - Partially FDG-avid left hilar lymphadenopathy/left upper lobe mass is similar in size as compared to CT dated 4/25/2025, and decreased in size and is similar in metabolism as compared to prior PET/CT. There is increased aeration of the left upper lobe as compared to 4/25/2025 and prior PET/CT. A small left apical pneumothorax, new since prior PET/CT, is decreased since 4/25/2025. Within the left upper lobe there is a small FDG-avid nodular opacity which is not definitely seen on 4/25/2025. - A small left pleural effusion is mildly increased since 4/25/2025. - Nonspecific diffuse gastric hypermetabolism, mildly increased, may reflect gastritis. - Nonspecific, mild FDG avidity and nonenlarged prostate gland, similar to prior study. - Possible paralyzed left vocal cord. Correlate clinically for hoarseness and with direct visualization, if indicated.  Today

## 2025-05-30 NOTE — ASSESSMENT
[FreeTextEntry1] : 63M former smoker with Stage IV/metastatic NSCLC with adenocarcinoma histology with a large ABAD primary tumor with evidence of metastatic disease involving the intrathoracic lymph nodes, cervical lymph nodes and brain.    Patient is status post craniotomy resection of a large left frontal brain tumor metastasis October 2024.  Tumor tested PD-L1 high-positive and Foundation negative for actionable mutations (+ BRAF G466A, NF1, TP53, among others) and TMB-High. Received post-op GK-SRS in late-October 2024. He started first-line systemic therapy with combination of chemotherapy and immunotherapy with carboplatin, pemetrexed, and pembrolizumab on 11/8/24; achieved nice AR. Treated with 4 cycles of triplet therapy through early January 2025 followed by pemetrexed/pembrolizumab maintenance as of late January 2025.  Treated with maintenance therapy through February 2025.  Developed disease progression in March 2025.  Started second line docetaxel and ramucirumab on 3/14/25. Patient is status post C2 of docetaxel/ramucirumab on 4//25.  C3 plan for 4/25 was held secondary to the patient undergoing a bronchoscopy where he had debulking of ABAD endobronchial tumor. Patient is status post hospitalization 5/1-5//25 after experiencing a seizure.  Patient was monitored in the epilepsy unit and medications were adjusted with addition of lacosamide. Achieved AR to Docetaxel/Ramucirumab.  Restaging PET/CT 4/29/25 with response to interval therapy.  Restaging Brain MRI May 2025 with sustained intracranial response.  Now s/p C3 on 5/16/25.  Recommend: - Continue second line treatment with Docetaxel 60mg/m2 and Ramucirumab 7.5mg/kg IV every 3 weeks.  For cycle 4 on 6/6.   - Potential treatment with nab-paclitaxel discussed should that become necessary.   - Repeat labs today - Perioidic UA while on ramucirumab.  - Continue dexamethasone in the stacey-chemo setting the day before and the day after chemo.  - Patient currently declining Mediport placement.  - Anemia. Secondary to chemo.  Monitor hemoglobin. - Elevated blood pressure. Continue to monitor for need for antihypertensives while on ramucirumab.  - F/U with IP for periodic bronchoscopies given ABAD endobronchial tumor - Continue follow up with Dr. Reyes and Dr. Robb, s/p craniotomy resection of large frontal tumor 10/1/24 and s/p GK-SRS completed 10/31/24. For Brain MRI in Aug 2025 (3 month scan) - There was an apparent lytic lesion of the left iliac bone noted on inpatient CT scan, however, this was not noted to be hypermetabolic on his initial PET CT scan.  Would avoid the addition of a bone modifying agent at this juncture - PET CT scan noted non-FDG avid thickening of the upper esophagus and nonspecific avidity of the stomach which were felt to be indeterminant. Previously recommended GI evaluation for EGD.  Can pursue this down the line.  - Patient declined psycho-onc referral.  -F/U with Neuro for management of seizures.  Now on Lacosamide.  - Follow-up following treatment mid-cycle     -Restaging scan ordered to be done in July prior to Late July MD visit

## 2025-05-30 NOTE — HISTORY OF PRESENT ILLNESS
[Disease: _____________________] : Disease: [unfilled] [AJCC Stage: ____] : AJCC Stage: [unfilled] [de-identified] : Mr. Tomlin is a 63-year-old man, non-smoker, with PMH of HTN and DM2 who presented to [facility] 9/26/24 with decreased intake and altered mental status x 1 week. CT head followed by brain MRI showed a 6.5 x 5.1 x 4.8 cm peripherally enhancing, centrally necrotic mass in the left anterior frontal lobe, with rightward midline shift. 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 On 10/1/24 he underwent left craniotomy for tumor resection. Pathology of the excised left frontal brain tumor was interpreted as showing non-small cell carcinoma compatible with adenocarcinoma of lung origin. PDL1 TPS >95%.  Foundation negative for actionable mutations (+ BRAF G466A, NF1, TP53, among others) and TMB-High. Treated with GK-SRS to brain in late October 2024.  Started first line combination chemotherapy and immunotherapy with Carboplatin/Pemetrexed and Pembrolizumab 11/8/24.  Achieved FL.  Treated with 4 cycles of triplet therapy through early January 2025 followed by pemetrexed/pembrolizumab maintenance as of late January 2025.  Treated with maintenance therapy through February 2025.  Developed disease progression in March 2025 and started second line docetaxel/ramucirumab on 3/14/25. Case discussed at interdisciplinary tumor board with recommendation for bronchoscopy with IP due to tumor narrowing of the airway, and possible palliative thoracic RT afterwards. Achieved FL to 2nd line therapy.  Patient underwent a bronchoscopy in late April 2025 where he had debulking of ABAD endobronchial tumor.  Patient is status post hospitalization 5/1-5//25 after experiencing a seizure. Patient was monitored in the epilepsy unit and medications were adjusted with addition of lacosamide.  Resumed 2nd line therapy in mid-May 2025.   [de-identified] : - Left frontal brain tumor excision: 10/1/2024: Metastatic non-small cell carcinoma compatible with adenocarcinoma of lung origin. PD-L1 high-positive > 95%.  Foundation:  + BRAF G466A, NF1, TP53, among others.  TMB-High.  [de-identified] : Mr. Tomlin is on second line systemic therapy with docetaxel and ramucirumab started 3/14/25, for metastatic non-small cell lung adenocarcinoma with resected brain metastasis s/p GK-SRS in late Oct 2024.  Patient presents today with his wife for follow-up.  He is s/p C3 of Docetaxel/Ramucirumab on 5/16.  He continues on the seizure medications. No further seizures. He reports fatigue.  Overall feeling fairly well and tolerating therapy.   Denies F/C/N/V. No neuropathy.    Brain MRI from 5/6/25 with sustained intracranial response.  A 3 month MRI is planned.

## 2025-05-30 NOTE — PHYSICAL EXAM
[Ambulatory and capable of all self care but unable to carry out any work activities] : Status 2- Ambulatory and capable of all self care but unable to carry out any work activities. Up and about more than 50% of waking hours [Normal] : affect appropriate [de-identified] : No icterus.  [de-identified] : MMM O/P Clear [de-identified] : Supple No LAD [de-identified] : Somewhat distant BS [de-identified] : S1 S2 [de-identified] : Trace LE edema  [de-identified] : No spine/CVA tenderness  [de-identified] : Ambulatory.

## 2025-05-30 NOTE — ASSESSMENT
[FreeTextEntry1] : 63M former smoker with Stage IV/metastatic NSCLC with adenocarcinoma histology with a large ABAD primary tumor with evidence of metastatic disease involving the intrathoracic lymph nodes, cervical lymph nodes and brain.    Patient is status post craniotomy resection of a large left frontal brain tumor metastasis October 2024.  Tumor tested PD-L1 high-positive and Foundation negative for actionable mutations (+ BRAF G466A, NF1, TP53, among others) and TMB-High. Received post-op GK-SRS in late-October 2024. He started first-line systemic therapy with combination of chemotherapy and immunotherapy with carboplatin, pemetrexed, and pembrolizumab on 11/8/24; achieved nice CT. Treated with 4 cycles of triplet therapy through early January 2025 followed by pemetrexed/pembrolizumab maintenance as of late January 2025.  Treated with maintenance therapy through February 2025.  Developed disease progression in March 2025.  Started second line docetaxel and ramucirumab on 3/14/25. Patient is status post C2 of docetaxel/ramucirumab on 4//25.  C3 plan for 4/25 was held secondary to the patient undergoing a bronchoscopy where he had debulking of ABAD endobronchial tumor. Patient is status post hospitalization 5/1-5//25 after experiencing a seizure.  Patient was monitored in the epilepsy unit and medications were adjusted with addition of lacosamide. Achieved CT to Docetaxel/Ramucirumab.  Restaging PET/CT 4/29/25 with response to interval therapy.  Restaging Brain MRI May 2025 with sustained intracranial response.  Now s/p C3 on 5/16/25.  Recommend: - Continue second line treatment with Docetaxel 60mg/m2 and Ramucirumab 7.5mg/kg IV every 3 weeks.  For cycle 4 on 6/6.   - Potential treatment with nab-paclitaxel discussed should that become necessary.   - Repeat labs today - Perioidic UA while on ramucirumab.  - Continue dexamethasone in the stacey-chemo setting the day before and the day after chemo.  - Patient currently declining Mediport placement.  - Anemia. Secondary to chemo.  Monitor hemoglobin. - Elevated blood pressure. Continue to monitor for need for antihypertensives while on ramucirumab.  - F/U with IP for periodic bronchoscopies given ABAD endobronchial tumor - Continue follow up with Dr. Reyes and Dr. Robb, s/p craniotomy resection of large frontal tumor 10/1/24 and s/p GK-SRS completed 10/31/24. For Brain MRI in Aug 2025 (3 month scan) - There was an apparent lytic lesion of the left iliac bone noted on inpatient CT scan, however, this was not noted to be hypermetabolic on his initial PET CT scan.  Would avoid the addition of a bone modifying agent at this juncture - PET CT scan noted non-FDG avid thickening of the upper esophagus and nonspecific avidity of the stomach which were felt to be indeterminant. Previously recommended GI evaluation for EGD.  Can pursue this down the line.  - Patient declined psycho-onc referral.  -F/U with Neuro for management of seizures.  Now on Lacosamide.  - Follow-up following treatment mid-cycle     -Restaging scan ordered to be done in July prior to Late July MD visit

## 2025-05-30 NOTE — HISTORY OF PRESENT ILLNESS
[Disease: _____________________] : Disease: [unfilled] [AJCC Stage: ____] : AJCC Stage: [unfilled] [de-identified] : Mr. Tomlin is a 63-year-old man, non-smoker, with PMH of HTN and DM2 who presented to [facility] 9/26/24 with decreased intake and altered mental status x 1 week. CT head followed by brain MRI showed a 6.5 x 5.1 x 4.8 cm peripherally enhancing, centrally necrotic mass in the left anterior frontal lobe, with rightward midline shift. 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 On 10/1/24 he underwent left craniotomy for tumor resection. Pathology of the excised left frontal brain tumor was interpreted as showing non-small cell carcinoma compatible with adenocarcinoma of lung origin. PDL1 TPS >95%.  Foundation negative for actionable mutations (+ BRAF G466A, NF1, TP53, among others) and TMB-High. Treated with GK-SRS to brain in late October 2024.  Started first line combination chemotherapy and immunotherapy with Carboplatin/Pemetrexed and Pembrolizumab 11/8/24.  Achieved DE.  Treated with 4 cycles of triplet therapy through early January 2025 followed by pemetrexed/pembrolizumab maintenance as of late January 2025.  Treated with maintenance therapy through February 2025.  Developed disease progression in March 2025 and started second line docetaxel/ramucirumab on 3/14/25. Case discussed at interdisciplinary tumor board with recommendation for bronchoscopy with IP due to tumor narrowing of the airway, and possible palliative thoracic RT afterwards. Achieved DE to 2nd line therapy.  Patient underwent a bronchoscopy in late April 2025 where he had debulking of ABAD endobronchial tumor.  Patient is status post hospitalization 5/1-5//25 after experiencing a seizure. Patient was monitored in the epilepsy unit and medications were adjusted with addition of lacosamide.  Resumed 2nd line therapy in mid-May 2025.   [de-identified] : - Left frontal brain tumor excision: 10/1/2024: Metastatic non-small cell carcinoma compatible with adenocarcinoma of lung origin. PD-L1 high-positive > 95%.  Foundation:  + BRAF G466A, NF1, TP53, among others.  TMB-High.  [de-identified] : Mr. Tomlin is on second line systemic therapy with docetaxel and ramucirumab started 3/14/25, for metastatic non-small cell lung adenocarcinoma with resected brain metastasis s/p GK-SRS in late Oct 2024.  Patient presents today with his wife for follow-up.  He is s/p C3 of Docetaxel/Ramucirumab on 5/16.  He continues on the seizure medications. No further seizures. He reports fatigue.  Overall feeling fairly well and tolerating therapy.   Denies F/C/N/V. No neuropathy.    Brain MRI from 5/6/25 with sustained intracranial response.  A 3 month MRI is planned.

## 2025-05-30 NOTE — PHYSICAL EXAM
[Ambulatory and capable of all self care but unable to carry out any work activities] : Status 2- Ambulatory and capable of all self care but unable to carry out any work activities. Up and about more than 50% of waking hours [Normal] : affect appropriate [de-identified] : No icterus.  [de-identified] : MMM O/P Clear [de-identified] : Supple No LAD [de-identified] : Somewhat distant BS [de-identified] : S1 S2 [de-identified] : Trace LE edema  [de-identified] : No spine/CVA tenderness  [de-identified] : Ambulatory.

## 2025-05-30 NOTE — RESULTS/DATA
[FreeTextEntry1] : - CT C/A/P with contrast 9/26/24: Large left lung mass concerning for primary lung neoplasm. 1.3 cm gastrohepatic lymph node, and 1.4cm lytic lesion in the left iliac bone concerning for distal metastasis.   - CT Head without contrast: 9/26/24: 6.1 x 5 x 3.8 cm mostly hypoattenuating mass lesion in the left anterior frontal lobe, crossing the anterior falx into the medial right temporal lobe with surrounding vasogenic edema. There is severe mass effect on the surrounding parenchyma and on the ventricular system with marked left-to-right midline shift of 1.6cm. Recommend contrast-enhanced MRI brain for further characterization.   - MRI brain 9/30/24: 6.5 x 5.1 x 4.8 cm peripherally enhancing, centrally necrotic mass within the left anterior frontal lobe demonstrating regions of restricted diffusion. Findings are suspicious for the presence of glioblastoma multiform. Differential diagnosis includes metastatic disease.  Right midline shift of 1cm. Rightward subfalcine herniation of 1.6cm. Basal cisterns are visualized. Mild asymmetric dilation of the right lateral ventricle.   - Surgical Pathology 10/1/24: 1. Left frontal tumor on dura, biopsy: Metastatic non-small cell carcinoma, compatible with adenocarcinoma of lung origin. 2. Left frontal tumor on dura, biopsy: Metastatic non-small cell carcinoma, compatible with adenocarcinoma of lung origin. 3. Left frontal brain tumor on dura, excision: Metastatic non-small cell carcinoma, compatible with adenocarcinoma of lung origin.  Note: Sections show a high-grade malignant neoplasm with predominantly solid growth and scattered foci of necrosis.  IHC: neoplastic cells are diffusely positive for high-molecular weight (HMW), cytokeratin, CK7, TTF-1, and p63, and are negative for CK20, GATA3, p40, NKX3.1, and PAAX8. GFAP highlights background brain parenchyma.  PDL1 TPS >95%.   - Cytology 10/1/24: Brain, left, frontal, fluid: Positive for malignant cells. Carcinoma.   - CT head without contrast 10/2/24: Postoperative changes compatible with left frontal craniotomy. Some low attenuation identified in the postop region with some associated air as well as subtle high attenuation. this area of high changes compatible areas of hemorrhage on postop material. Surrounding edema is again seen. Decreased mass effect on the anteior aspect of both lateral ventricle is seen. Residual right left shift is seen (1.1cm).   -MR Brain 10/2/24: Postsurgical changes in the left frontal region status post left frontal craniotomy and mass resection.  Left frontal vasogenic edema with mass effect on the left lateral ventricle and approximately 1cm of rightward midline shift similar to presurgical exam.  Focal plaque like region of enhancement in the posterior aspect of the resection cavity possibly representing residual enhancing tumor versus postoperative changes. Recommend continued follow-up.   - PET/CT 10/16/24:  1. Large peripherally FDG-avid, centrally photopenic multiloculated mass in the left perihilar/paramediastinal left upper lobe corresponds to known malignancy. Separate FDG-avid left upper lobe nodule is compatible with metastasis.  2. FDG-avid left perihilar, bilateral mediastinal, bilateral cervical, and distal right external iliac lymph nodes. The left perihilar and AP window lymph nodes are compatible with metastatic disease. Small FDG-avid bilateral mediastinal/cervical and right external iliac lymph nodes are indeterminate. Differential diagnosis includes inflammatory and/or neoplastic etiologies. Cervical/supraclavicular lymph nodes are accessible to an ultrasound-guided percutaneous needle biopsy.  3. Enlarged prostate gland with right greater than left hypermetabolism. Differential diagnosis includes infectious/inflammatory etiologies and prostate carcinoma. Urology consultation is recommended.  4. Nonspecific FDG-avid thickening of upper esophagus and nonspecific hypermetabolism of fundus and body of stomach, most intense along the lesser curvature, are indeterminate. Endoscopy is recommended to exclude neoplasm.  5. Mild, diffuse hypermetabolism in nonenlarged thyroid gland suggests thyroiditis. Please correlate clinically.  -PET/CT 12/23/24:   1. Compared with PET/CT 10/16/2024, there has been interval response to therapy with residual FDG avid disease evident. 2. Resolution of FDG avid cervical lymph nodes. 3. Decrease in number of FDG avid lymph nodes in the mediastinum and hilum. Small lymph nodes in the AP window remain evident, with mild residual FDG uptake. 4. Decrease in size of a left perihilar mass and left upper lobe mass, with decrease in extent of FDG avidity and with focal areas of increasing FDG avidity when compared to prior exam. 5. Similar size and mildly decreased activity in an apical left upper lobe nodule. 6. Decrease in size and FDG uptake in the prostate, possibly due to resolving infection. Decrease in FDG avidity of a right external iliac lymph node, which may have been reactive. Recommend urology consultation if not previously performed. 7. Resolution of FDG avidity in a thickened upper esophagus. Increase in nonspecific hypermetabolism in the stomach. Recommend correlation with endoscopy. 8. Similar mild diffuse hypermetabolism in the thyroid. This can be correlated with TSH.  -MRI Brain 1/21/25:  No findings suspicious for tumor progression. Indeterminate 6 mm focus of nodular enhancement at the posterior margin of the left frontal resection bed. Attention on follow-up exams is advised. No findings suspicious for new sites of metastatic disease.  -PET/CT 3/9/25:  Compared to FDG-PET/CT scan dated 12/23/2024:  1. FDG-avid partially necrotic left upper lobe mass and ipsilateral mediastinal/hilar lymphadenopathy, increased as compared to prior study, with associated increased left upper lobe atelectasis. Findings are compatible with progression of disease. Previously noted FDG-avid left apical nodule is not delineated within the atelectatic left upper lobe/lymphadenopathy.  2. Nonspecific gastric hypermetabolism is decreased.  3. Nonspecific, mild FDG avidity and nonenlarged prostate gland, similar in metabolism and less extensive.  - Bronchoscopy 4/23/2025: Left lung with endobronchial tumor from the ABAD; normal airway exam otherwise.  Underwent tumor debulking of ABAD endobronchial tumor. Lung, ABAD biopsy 4/23/2025: Positive for malignant cells.  Non-small cell carcinoma, favor adenocarcinoma.  - CT Chest 4/25/25: Moderate left pneumothorax with partial atelectasis of the left upper lobe.  -PET/CT 4/29/25:  Compared to FDG-PET/CT scan dated 3/9/2025: 1. Partially FDG-avid left hilar lymphadenopathy/left upper lobe mass is similar in size as compared to CT dated 4/25/2025, and decreased in size and is similar in metabolism as compared to prior PET/CT. There is increased aeration of the left upper lobe as compared to 4/25/2025 and prior PET/CT. A small left apical pneumothorax, new since prior PET/CT, is decreased since 4/25/2025. Within the left upper lobe there is a small FDG-avid nodular opacity which is not definitely seen on 4/25/2025. 2. A small left pleural effusion is mildly increased since 4/25/2025. 3. Nonspecific diffuse gastric hypermetabolism, mildly increased, may reflect gastritis. 4. Nonspecific, mild FDG avidity and nonenlarged prostate gland, similar to prior study. 5. Possible paralyzed left vocal cord. Correlate clinically for hoarseness and with direct visualization, if indicated.  -CT Brain 5/1/25:  Encephalomalacia and gliosis left frontal lobe with resolution of vasogenic edema and mass effect since 10/2/2024.  -LE Dopplers 5/1/25:  No evidence of deep venous thrombosis in either lower extremity.  Images Reviewed/Interpreted:  -MRI Brain 5/6/25:  No new abnormal enhancement or signal to suggest recurrence of disease or new disease.

## 2025-07-30 NOTE — ASSESSMENT
[FreeTextEntry1] : 63M former smoker with Stage IV/metastatic NSCLC with adenocarcinoma histology with a large ABAD primary tumor with evidence of metastatic disease involving the intrathoracic lymph nodes, cervical lymph nodes and brain.    Patient is status post craniotomy resection of a large left frontal brain tumor metastasis October 2024.  Tumor tested PD-L1 high-positive and Foundation negative for actionable mutations (+ BRAF G466A, NF1, TP53, among others) and TMB-High. Received post-op GK-SRS in late-October 2024. He started first-line systemic therapy with combination of chemotherapy and immunotherapy with carboplatin, pemetrexed, and pembrolizumab on 11/8/24; achieved nice [place]. Treated with 4 cycles of triplet therapy through early January 2025 followed by pemetrexed/pembrolizumab maintenance as of late January 2025.  Treated with maintenance therapy through February 2025.  Developed disease progression in March 2025.  Started second line docetaxel and ramucirumab on 3/14/25. Patient is status post C2 of docetaxel/ramucirumab on 4//25.  C3 plan for 4/25 was held secondary to the patient undergoing a bronchoscopy where he had debulking of ABAD endobronchial tumor. Patient is status post hospitalization 5/1-5//25 after experiencing a seizure.  Patient was monitored in the epilepsy unit and medications were adjusted with addition of lacosamide. Achieved [place] to Docetaxel/Ramucirumab.  Restaging PET/CT 4/29/25 with response to interval therapy.  Restaging Brain MRI May 2025 with sustained intracranial response.  Now s/p C5 on 6/27/25.  Recommend: - Continue second line treatment with Docetaxel 60mg/m2 and Ramucirumab 7.5mg/kg IV every 3 weeks.  For cycle 6 on 7/18.   - Potential treatment with nab-paclitaxel should that become necessary.   - Repeat labs today - Periodic UA while on ramucirumab.  - Continue dexamethasone in the stacey-chemo setting the day before and the day after chemo.  - Patient currently declining Mediport placement.  - Phlebitis. Can use warm compresses, OTC analgesics as needed.  - Anemia. Secondary to chemo.  Monitor hemoglobin. - F/U with IP for periodic bronchoscopies given ABAD endobronchial tumor, last done 4/23/25; next f/u appt with Dr. Portillo in August.  - Continue follow up with Dr. Reyes and Dr. Robb, s/p craniotomy resection of large frontal tumor 10/1/24 and s/p GK-SRS completed 10/31/24. MRI Brain in May showed sustained response. For Brain MRI in Aug 2025 (3-month scan) - There was an apparent lytic lesion of the left iliac bone noted on inpatient CT scan, however, this was not noted to be hypermetabolic on his initial PET CT scan.  Would avoid the addition of a bone modifying agent at this juncture - PET CT scan noted non-FDG avid thickening of the upper esophagus and nonspecific avidity of the stomach which were felt to be indeterminant. Previously recommended GI evaluation for EGD.  Can pursue this down the line.  - Patient declined psycho-onc referral.  - F/U with Neuro for management of seizures.  Now on Lacosamide.  - Edema. Continue compression socks.  - Follow-up following treatment mid-cycle     - Scheduled for restaging PET CT 7/19 for review on 7/30 MD follow up.  Scheduled through early Sept.

## 2025-07-30 NOTE — ASSESSMENT
[FreeTextEntry1] : 63M former smoker with Stage IV/metastatic NSCLC with adenocarcinoma histology with a large ABAD primary tumor with evidence of metastatic disease involving the intrathoracic lymph nodes, cervical lymph nodes and brain.    Patient is status post craniotomy resection of a large left frontal brain tumor metastasis October 2024.  Tumor tested PD-L1 high-positive and Foundation negative for actionable mutations (+ BRAF G466A, NF1, TP53, among others) and TMB-High. Received post-op GK-SRS in late-October 2024. He started first-line systemic therapy with combination of chemotherapy and immunotherapy with carboplatin, pemetrexed, and pembrolizumab on 11/8/24; achieved nice [place]. Treated with 4 cycles of triplet therapy through early January 2025 followed by pemetrexed/pembrolizumab maintenance as of late January 2025.  Treated with maintenance therapy through February 2025.  Developed disease progression in March 2025.  Started second line docetaxel and ramucirumab on 3/14/25. Patient is status post C2 of docetaxel/ramucirumab on 4//25.  C3 plan for 4/25 was held secondary to the patient undergoing a bronchoscopy where he had debulking of ABAD endobronchial tumor. Patient is status post hospitalization 5/1-5//25 after experiencing a seizure.  Patient was monitored in the epilepsy unit and medications were adjusted with addition of lacosamide. Achieved [place] to Docetaxel/Ramucirumab.  Restaging PET/CT 4/29/25 with response to interval therapy.  Restaging Brain MRI May 2025 with sustained intracranial response.  Now s/p C5 on 6/27/25.  Recommend: - Continue second line treatment with Docetaxel 60mg/m2 and Ramucirumab 7.5mg/kg IV every 3 weeks.  For cycle 6 on 7/18.   - Potential treatment with nab-paclitaxel should that become necessary.   - Repeat labs today - Periodic UA while on ramucirumab.  - Continue dexamethasone in the stacey-chemo setting the day before and the day after chemo.  - Patient currently declining Mediport placement.  - Phlebitis. Can use warm compresses, OTC analgesics as needed.  - Anemia. Secondary to chemo.  Monitor hemoglobin. - F/U with IP for periodic bronchoscopies given ABAD endobronchial tumor, last done 4/23/25; next f/u appt with Dr. Portillo in August.  - Continue follow up with Dr. Reyes and Dr. Robb, s/p craniotomy resection of large frontal tumor 10/1/24 and s/p GK-SRS completed 10/31/24. MRI Brain in May showed sustained response. For Brain MRI in Aug 2025 (3-month scan) - There was an apparent lytic lesion of the left iliac bone noted on inpatient CT scan, however, this was not noted to be hypermetabolic on his initial PET CT scan.  Would avoid the addition of a bone modifying agent at this juncture - PET CT scan noted non-FDG avid thickening of the upper esophagus and nonspecific avidity of the stomach which were felt to be indeterminant. Previously recommended GI evaluation for EGD.  Can pursue this down the line.  - Patient declined psycho-onc referral.  - F/U with Neuro for management of seizures.  Now on Lacosamide.  - Edema. Continue compression socks.  - Follow-up following treatment mid-cycle     - Scheduled for restaging PET CT 7/19 for review on 7/30 MD follow up.  Scheduled through early Sept.

## 2025-07-30 NOTE — HISTORY OF PRESENT ILLNESS
[Disease: _____________________] : Disease: [unfilled] [AJCC Stage: ____] : AJCC Stage: [unfilled] [de-identified] : Mr. Tomlin is a 63-year-old man, non-smoker, with PMH of HTN and DM2 who presented to [facility] 9/26/24 with decreased intake and altered mental status x 1 week. CT head followed by brain MRI showed a 6.5 x 5.1 x 4.8 cm peripherally enhancing, centrally necrotic mass in the left anterior frontal lobe, with rightward midline shift. 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 On 10/1/24 he underwent left craniotomy for tumor resection. Pathology of the excised left frontal brain tumor was interpreted as showing non-small cell carcinoma compatible with adenocarcinoma of lung origin. PDL1 TPS >95%.  Foundation negative for actionable mutations (+ BRAF G466A, NF1, TP53, among others) and TMB-High. Treated with GK-SRS to brain in late October 2024.  Started first line combination chemotherapy and immunotherapy with Carboplatin/Pemetrexed and Pembrolizumab 11/8/24.  Achieved [place].  Treated with 4 cycles of triplet therapy through early January 2025 followed by pemetrexed/pembrolizumab maintenance as of late January 2025.  Treated with maintenance therapy through February 2025.  Developed disease progression in March 2025 and started second line docetaxel/ramucirumab on 3/14/25. Case discussed at interdisciplinary tumor board with recommendation for bronchoscopy with IP due to tumor narrowing of the airway, and possible palliative thoracic RT afterwards. Achieved [place] to 2nd line therapy.  Patient underwent a bronchoscopy in late April 2025 where he had debulking of ABAD endobronchial tumor.  Patient is status post hospitalization 5/1-5//25 after experiencing a seizure. Patient was monitored in the epilepsy unit and medications were adjusted with addition of lacosamide.  Resumed 2nd line therapy in mid-May 2025.   [de-identified] : - Left frontal brain tumor excision: 10/1/2024: Metastatic non-small cell carcinoma compatible with adenocarcinoma of lung origin. PD-L1 high-positive > 95%.  Foundation:  + BRAF G466A, NF1, TP53, among others.  TMB-High.  [de-identified] : Mr. Tomlin is on second line systemic therapy with docetaxel and ramucirumab started 3/14/25, for metastatic non-small cell lung adenocarcinoma with resected brain metastasis s/p GK-SRS in late Oct 2024. Most recent brain MRI from 5/6/25 with sustained intracranial response. s/p C5 docetaxel and ramucirumab on 6/27/25 and is here for mid-cycle evaluation.  Reports non-bothersome areas of skin changes from previous IV sites; points most recently to area inferomedial to left antecubital space.  States he does not want mediport at this time.  Now using compression socks for bilateral lower leg edema which he feels is helpful. Reports overall feeling well. Denies F/C/N/V, cough, dyspnea, bleeding, elevated BP, constipation, diarrhea, rash, or pain.   Most recent restaging PET CT 4/29/25 with response to interval therapy.  Most recent restaging brain MRI 5/6/25 with sustained intracranial response.

## 2025-07-30 NOTE — PHYSICAL EXAM
[Ambulatory and capable of all self care but unable to carry out any work activities] : Status 2- Ambulatory and capable of all self care but unable to carry out any work activities. Up and about more than 50% of waking hours [Normal] : grossly intact [de-identified] : No icterus.  [de-identified] : MMM O/P Clear [de-identified] : Supple No LAD [de-identified] : Clear [de-identified] : S1 S2 [de-identified] : 1+ pitting edema bilateral ankles.  [de-identified] : Soft, NT, ND [de-identified] : No spine/CVA tenderness  [de-identified] : Ambulatory.  [de-identified] : No rash or skin lesions.  [de-identified] : Pleasant

## 2025-07-30 NOTE — PHYSICAL EXAM
[Ambulatory and capable of all self care but unable to carry out any work activities] : Status 2- Ambulatory and capable of all self care but unable to carry out any work activities. Up and about more than 50% of waking hours [Normal] : grossly intact [de-identified] : No icterus.  [de-identified] : MMM O/P Clear [de-identified] : Supple No LAD [de-identified] : Clear [de-identified] : S1 S2 [de-identified] : 1+ pitting edema bilateral ankles.  [de-identified] : Soft, NT, ND [de-identified] : No spine/CVA tenderness  [de-identified] : Ambulatory.  [de-identified] : No rash or skin lesions.  [de-identified] : Pleasant

## 2025-07-30 NOTE — RESULTS/DATA
[FreeTextEntry1] : - CT C/A/P with contrast 9/26/24: Large left lung mass concerning for primary lung neoplasm. 1.3 cm gastrohepatic lymph node, and 1.4cm lytic lesion in the left iliac bone concerning for distal metastasis.   - CT Head without contrast: 9/26/24: 6.1 x 5 x 3.8 cm mostly hypoattenuating mass lesion in the left anterior frontal lobe, crossing the anterior falx into the medial right temporal lobe with surrounding vasogenic edema. There is severe mass effect on the surrounding parenchyma and on the ventricular system with marked left-to-right midline shift of 1.6cm. Recommend contrast-enhanced MRI brain for further characterization.   - MRI brain 9/30/24: 6.5 x 5.1 x 4.8 cm peripherally enhancing, centrally necrotic mass within the left anterior frontal lobe demonstrating regions of restricted diffusion. Findings are suspicious for the presence of glioblastoma multiform. Differential diagnosis includes metastatic disease.  Right midline shift of 1cm. Rightward subfalcine herniation of 1.6cm. Basal cisterns are visualized. Mild asymmetric dilation of the right lateral ventricle.   - Surgical Pathology 10/1/24: 1. Left frontal tumor on dura, biopsy: Metastatic non-small cell carcinoma, compatible with adenocarcinoma of lung origin. 2. Left frontal tumor on dura, biopsy: Metastatic non-small cell carcinoma, compatible with adenocarcinoma of lung origin. 3. Left frontal brain tumor on dura, excision: Metastatic non-small cell carcinoma, compatible with adenocarcinoma of lung origin.  Note: Sections show a high-grade malignant neoplasm with predominantly solid growth and scattered foci of necrosis.  IHC: neoplastic cells are diffusely positive for high-molecular weight (HMW), cytokeratin, CK7, TTF-1, and p63, and are negative for CK20, GATA3, p40, NKX3.1, and PAAX8. GFAP highlights background brain parenchyma.  PDL1 TPS >95%.   - Cytology 10/1/24: Brain, left, frontal, fluid: Positive for malignant cells. Carcinoma.   - CT head without contrast 10/2/24: Postoperative changes compatible with left frontal craniotomy. Some low attenuation identified in the postop region with some associated air as well as subtle high attenuation. this area of high changes compatible areas of hemorrhage on postop material. Surrounding edema is again seen. Decreased mass effect on the anteior aspect of both lateral ventricle is seen. Residual right left shift is seen (1.1cm).   -MR Brain 10/2/24: Postsurgical changes in the left frontal region status post left frontal craniotomy and mass resection.  Left frontal vasogenic edema with mass effect on the left lateral ventricle and approximately 1cm of rightward midline shift similar to presurgical exam.  Focal plaque like region of enhancement in the posterior aspect of the resection cavity possibly representing residual enhancing tumor versus postoperative changes. Recommend continued follow-up.   - PET/CT 10/16/24:  1. Large peripherally FDG-avid, centrally photopenic multiloculated mass in the left perihilar/paramediastinal left upper lobe corresponds to known malignancy. Separate FDG-avid left upper lobe nodule is compatible with metastasis.  2. FDG-avid left perihilar, bilateral mediastinal, bilateral cervical, and distal right external iliac lymph nodes. The left perihilar and AP window lymph nodes are compatible with metastatic disease. Small FDG-avid bilateral mediastinal/cervical and right external iliac lymph nodes are indeterminate. Differential diagnosis includes inflammatory and/or neoplastic etiologies. Cervical/supraclavicular lymph nodes are accessible to an ultrasound-guided percutaneous needle biopsy.  3. Enlarged prostate gland with right greater than left hypermetabolism. Differential diagnosis includes infectious/inflammatory etiologies and prostate carcinoma. Urology consultation is recommended.  4. Nonspecific FDG-avid thickening of upper esophagus and nonspecific hypermetabolism of fundus and body of stomach, most intense along the lesser curvature, are indeterminate. Endoscopy is recommended to exclude neoplasm.  5. Mild, diffuse hypermetabolism in nonenlarged thyroid gland suggests thyroiditis. Please correlate clinically.  -PET/CT 12/23/24:   1. Compared with PET/CT 10/16/2024, there has been interval response to therapy with residual FDG avid disease evident. 2. Resolution of FDG avid cervical lymph nodes. 3. Decrease in number of FDG avid lymph nodes in the mediastinum and hilum. Small lymph nodes in the AP window remain evident, with mild residual FDG uptake. 4. Decrease in size of a left perihilar mass and left upper lobe mass, with decrease in extent of FDG avidity and with focal areas of increasing FDG avidity when compared to prior exam. 5. Similar size and mildly decreased activity in an apical left upper lobe nodule. 6. Decrease in size and FDG uptake in the prostate, possibly due to resolving infection. Decrease in FDG avidity of a right external iliac lymph node, which may have been reactive. Recommend urology consultation if not previously performed. 7. Resolution of FDG avidity in a thickened upper esophagus. Increase in nonspecific hypermetabolism in the stomach. Recommend correlation with endoscopy. 8. Similar mild diffuse hypermetabolism in the thyroid. This can be correlated with TSH.  -MRI Brain 1/21/25:  No findings suspicious for tumor progression. Indeterminate 6 mm focus of nodular enhancement at the posterior margin of the left frontal resection bed. Attention on follow-up exams is advised. No findings suspicious for new sites of metastatic disease.  -PET/CT 3/9/25:  Compared to FDG-PET/CT scan dated 12/23/2024:  1. FDG-avid partially necrotic left upper lobe mass and ipsilateral mediastinal/hilar lymphadenopathy, increased as compared to prior study, with associated increased left upper lobe atelectasis. Findings are compatible with progression of disease. Previously noted FDG-avid left apical nodule is not delineated within the atelectatic left upper lobe/lymphadenopathy.  2. Nonspecific gastric hypermetabolism is decreased.  3. Nonspecific, mild FDG avidity and nonenlarged prostate gland, similar in metabolism and less extensive.  - Bronchoscopy 4/23/2025: Left lung with endobronchial tumor from the ABAD; normal airway exam otherwise.  Underwent tumor debulking of ABAD endobronchial tumor. Lung, ABAD biopsy 4/23/2025: Positive for malignant cells.  Non-small cell carcinoma, favor adenocarcinoma.  - CT Chest 4/25/25: Moderate left pneumothorax with partial atelectasis of the left upper lobe.  -PET/CT 4/29/25:  Compared to FDG-PET/CT scan dated 3/9/2025: 1. Partially FDG-avid left hilar lymphadenopathy/left upper lobe mass is similar in size as compared to CT dated 4/25/2025, and decreased in size and is similar in metabolism as compared to prior PET/CT. There is increased aeration of the left upper lobe as compared to 4/25/2025 and prior PET/CT. A small left apical pneumothorax, new since prior PET/CT, is decreased since 4/25/2025. Within the left upper lobe there is a small FDG-avid nodular opacity which is not definitely seen on 4/25/2025. 2. A small left pleural effusion is mildly increased since 4/25/2025. 3. Nonspecific diffuse gastric hypermetabolism, mildly increased, may reflect gastritis. 4. Nonspecific, mild FDG avidity and nonenlarged prostate gland, similar to prior study. 5. Possible paralyzed left vocal cord. Correlate clinically for hoarseness and with direct visualization, if indicated.  -CT Brain 5/1/25:  Encephalomalacia and gliosis left frontal lobe with resolution of vasogenic edema and mass effect since 10/2/2024.  -LE Dopplers 5/1/25:  No evidence of deep venous thrombosis in either lower extremity.  -MRI Brain 5/6/25:  No new abnormal enhancement or signal to suggest recurrence of disease or new disease.  Images Reviewed/Interpreted:  - PET CT 7/25/25: Since prior FDG-PET/CT scan 4/29/2025: 1.  Previously seen LEFT perihilar lesion has diminished in size and uptake. 2.  A centrally photopenic opacity in the LEFT UPPER lobe may represent residual findings from previously seen LEFT suprahilar lesion. 3.  Left UPPER lobe pulmonary nodularity shows diminished uptake. Resolution of previously seen small LEFT apical pneumothorax. 4.  Improved gastric uptake. 5.  Stable heterogeneous mild prostate gland uptake. 6.  Rest in body above.

## 2025-07-30 NOTE — ASSESSMENT
[FreeTextEntry1] : 63M former smoker with Stage IV/metastatic NSCLC with adenocarcinoma histology with a large ABAD primary tumor with evidence of metastatic disease involving the intrathoracic lymph nodes, cervical lymph nodes and brain.    Patient is status post craniotomy resection of a large left frontal brain tumor metastasis October 2024.  Tumor tested PD-L1 high-positive and Foundation negative for actionable mutations (+ BRAF G466A, NF1, TP53, among others) and TMB-High. Received post-op GK-SRS in late-October 2024. He started first-line systemic therapy with combination of chemotherapy and immunotherapy with carboplatin, pemetrexed, and pembrolizumab on 11/8/24; achieved nice [place]. Treated with 4 cycles of triplet therapy through early January 2025 followed by pemetrexed/pembrolizumab maintenance as of late January 2025.  Treated with maintenance therapy through February 2025.  Developed disease progression in March 2025.  Started second line docetaxel and ramucirumab on 3/14/25. Patient is status post C2 of docetaxel/ramucirumab on 4//25.  C3 plan for 4/25 was held secondary to the patient undergoing a bronchoscopy where he had debulking of ABAD endobronchial tumor. Patient is status post hospitalization 5/1-5//25 after experiencing a seizure.  Patient was monitored in the epilepsy unit and medications were adjusted with addition of lacosamide. Achieved [place] to Docetaxel/Ramucirumab.  Restaging PET/CT 4/29/25 with response to interval therapy.  Restaging Brain MRI May 2025 with sustained intracranial response.  Now s/p C5 on 6/27/25.  Recommend: - Continue second line treatment with Docetaxel 60mg/m2 and Ramucirumab 7.5mg/kg IV every 3 weeks.  For cycle 6 on 7/18.   - Potential treatment with nab-paclitaxel should that become necessary.   - Repeat labs today - Periodic UA while on ramucirumab.  - Continue dexamethasone in the stacey-chemo setting the day before and the day after chemo.  - Patient currently declining Mediport placement.  - Phlebitis. Can use warm compresses, OTC analgesics as needed.  - Anemia. Secondary to chemo.  Monitor hemoglobin. - F/U with IP for periodic bronchoscopies given ABAD endobronchial tumor, last done 4/23/25; next f/u appt with Dr. Portillo in August.  - Continue follow up with Dr. Reyes and Dr. Robb, s/p craniotomy resection of large frontal tumor 10/1/24 and s/p GK-SRS completed 10/31/24. MRI Brain in May showed sustained response. For Brain MRI in Aug 2025 (3-month scan) - There was an apparent lytic lesion of the left iliac bone noted on inpatient CT scan, however, this was not noted to be hypermetabolic on his initial PET CT scan.  Would avoid the addition of a bone modifying agent at this juncture - PET CT scan noted non-FDG avid thickening of the upper esophagus and nonspecific avidity of the stomach which were felt to be indeterminant. Previously recommended GI evaluation for EGD.  Can pursue this down the line.  - Patient declined psycho-onc referral.  - F/U with Neuro for management of seizures.  Now on Lacosamide.  - Edema. Continue compression socks.  - Follow-up following treatment mid-cycle     - Scheduled for restaging PET CT 7/19 for review on 7/30 MD follow up.  Scheduled through early Sept.

## 2025-07-30 NOTE — PHYSICAL EXAM
[Ambulatory and capable of all self care but unable to carry out any work activities] : Status 2- Ambulatory and capable of all self care but unable to carry out any work activities. Up and about more than 50% of waking hours [Normal] : grossly intact [de-identified] : No icterus.  [de-identified] : MMM O/P Clear [de-identified] : Supple No LAD [de-identified] : Clear [de-identified] : S1 S2 [de-identified] : 1+ pitting edema bilateral ankles.  [de-identified] : Soft, NT, ND [de-identified] : No spine/CVA tenderness  [de-identified] : Ambulatory.  [de-identified] : No rash or skin lesions.  [de-identified] : Pleasant

## 2025-07-30 NOTE — HISTORY OF PRESENT ILLNESS
[Disease: _____________________] : Disease: [unfilled] [AJCC Stage: ____] : AJCC Stage: [unfilled] [de-identified] : Mr. Tomlin is a 63-year-old man, non-smoker, with PMH of HTN and DM2 who presented to [facility] 9/26/24 with decreased intake and altered mental status x 1 week. CT head followed by brain MRI showed a 6.5 x 5.1 x 4.8 cm peripherally enhancing, centrally necrotic mass in the left anterior frontal lobe, with rightward midline shift. 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 On 10/1/24 he underwent left craniotomy for tumor resection. Pathology of the excised left frontal brain tumor was interpreted as showing non-small cell carcinoma compatible with adenocarcinoma of lung origin. PDL1 TPS >95%.  Foundation negative for actionable mutations (+ BRAF G466A, NF1, TP53, among others) and TMB-High. Treated with GK-SRS to brain in late October 2024.  Started first line combination chemotherapy and immunotherapy with Carboplatin/Pemetrexed and Pembrolizumab 11/8/24.  Achieved HI.  Treated with 4 cycles of triplet therapy through early January 2025 followed by pemetrexed/pembrolizumab maintenance as of late January 2025.  Treated with maintenance therapy through February 2025.  Developed disease progression in March 2025 and started second line docetaxel/ramucirumab on 3/14/25. Case discussed at interdisciplinary tumor board with recommendation for bronchoscopy with IP due to tumor narrowing of the airway, and possible palliative thoracic RT afterwards. Achieved HI to 2nd line therapy.  Patient underwent a bronchoscopy in late April 2025 where he had debulking of ABAD endobronchial tumor.  Patient is status post hospitalization 5/1-5//25 after experiencing a seizure. Patient was monitored in the epilepsy unit and medications were adjusted with addition of lacosamide.  Resumed 2nd line therapy in mid-May 2025.   [de-identified] : - Left frontal brain tumor excision: 10/1/2024: Metastatic non-small cell carcinoma compatible with adenocarcinoma of lung origin. PD-L1 high-positive > 95%.  Foundation:  + BRAF G466A, NF1, TP53, among others.  TMB-High.  [de-identified] : Mr. Tomlin is on second line systemic therapy with docetaxel and ramucirumab started 3/14/25, for metastatic non-small cell lung adenocarcinoma with resected brain metastasis s/p GK-SRS in late Oct 2024. Most recent brain MRI from 5/6/25 with sustained intracranial response. s/p C5 docetaxel and ramucirumab on 6/27/25 and is here for mid-cycle evaluation.  Reports non-bothersome areas of skin changes from previous IV sites; points most recently to area inferomedial to left antecubital space.  States he does not want mediport at this time.  Now using compression socks for bilateral lower leg edema which he feels is helpful. Reports overall feeling well. Denies F/C/N/V, cough, dyspnea, bleeding, elevated BP, constipation, diarrhea, rash, or pain.   Most recent restaging PET CT 4/29/25 with response to interval therapy.  Most recent restaging brain MRI 5/6/25 with sustained intracranial response.

## 2025-07-30 NOTE — HISTORY OF PRESENT ILLNESS
[Disease: _____________________] : Disease: [unfilled] [AJCC Stage: ____] : AJCC Stage: [unfilled] [de-identified] : Mr. Tomlin is a 63-year-old man, non-smoker, with PMH of HTN and DM2 who presented to [facility] 9/26/24 with decreased intake and altered mental status x 1 week. CT head followed by brain MRI showed a 6.5 x 5.1 x 4.8 cm peripherally enhancing, centrally necrotic mass in the left anterior frontal lobe, with rightward midline shift. 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 On 10/1/24 he underwent left craniotomy for tumor resection. Pathology of the excised left frontal brain tumor was interpreted as showing non-small cell carcinoma compatible with adenocarcinoma of lung origin. PDL1 TPS >95%.  Foundation negative for actionable mutations (+ BRAF G466A, NF1, TP53, among others) and TMB-High. Treated with GK-SRS to brain in late October 2024.  Started first line combination chemotherapy and immunotherapy with Carboplatin/Pemetrexed and Pembrolizumab 11/8/24.  Achieved WA.  Treated with 4 cycles of triplet therapy through early January 2025 followed by pemetrexed/pembrolizumab maintenance as of late January 2025.  Treated with maintenance therapy through February 2025.  Developed disease progression in March 2025 and started second line docetaxel/ramucirumab on 3/14/25. Case discussed at interdisciplinary tumor board with recommendation for bronchoscopy with IP due to tumor narrowing of the airway, and possible palliative thoracic RT afterwards. Achieved WA to 2nd line therapy.  Patient underwent a bronchoscopy in late April 2025 where he had debulking of ABAD endobronchial tumor.  Patient is status post hospitalization 5/1-5//25 after experiencing a seizure. Patient was monitored in the epilepsy unit and medications were adjusted with addition of lacosamide.  Resumed 2nd line therapy in mid-May 2025.   [de-identified] : - Left frontal brain tumor excision: 10/1/2024: Metastatic non-small cell carcinoma compatible with adenocarcinoma of lung origin. PD-L1 high-positive > 95%.  Foundation:  + BRAF G466A, NF1, TP53, among others.  TMB-High.  [de-identified] : Mr. Tomlin is on second line systemic therapy with docetaxel and ramucirumab started 3/14/25, for metastatic non-small cell lung adenocarcinoma with resected brain metastasis s/p GK-SRS in late Oct 2024. Most recent brain MRI from 5/6/25 with sustained intracranial response. s/p C5 docetaxel and ramucirumab on 6/27/25 and is here for mid-cycle evaluation.  Reports non-bothersome areas of skin changes from previous IV sites; points most recently to area inferomedial to left antecubital space.  States he does not want mediport at this time.  Now using compression socks for bilateral lower leg edema which he feels is helpful. Reports overall feeling well. Denies F/C/N/V, cough, dyspnea, bleeding, elevated BP, constipation, diarrhea, rash, or pain.   Most recent restaging PET CT 4/29/25 with response to interval therapy.  Most recent restaging brain MRI 5/6/25 with sustained intracranial response.